# Patient Record
Sex: FEMALE | Race: BLACK OR AFRICAN AMERICAN | Employment: OTHER | URBAN - METROPOLITAN AREA
[De-identification: names, ages, dates, MRNs, and addresses within clinical notes are randomized per-mention and may not be internally consistent; named-entity substitution may affect disease eponyms.]

---

## 2017-01-01 ENCOUNTER — CARE COORDINATION (OUTPATIENT)
Dept: CARE COORDINATION | Age: 72
End: 2017-01-01

## 2018-01-01 ENCOUNTER — APPOINTMENT (OUTPATIENT)
Dept: GENERAL RADIOLOGY | Age: 73
DRG: 025 | End: 2018-01-01
Payer: MEDICARE

## 2018-01-01 ENCOUNTER — HOSPITAL ENCOUNTER (INPATIENT)
Dept: ICU | Age: 73
LOS: 15 days | Discharge: HOSPICE | DRG: 025 | End: 2018-03-21
Attending: EMERGENCY MEDICINE | Admitting: INTERNAL MEDICINE
Payer: MEDICARE

## 2018-01-01 ENCOUNTER — APPOINTMENT (OUTPATIENT)
Dept: CT IMAGING | Age: 73
DRG: 025 | End: 2018-01-01
Payer: MEDICARE

## 2018-01-01 VITALS
OXYGEN SATURATION: 100 % | HEART RATE: 104 BPM | HEIGHT: 63 IN | DIASTOLIC BLOOD PRESSURE: 60 MMHG | SYSTOLIC BLOOD PRESSURE: 96 MMHG | TEMPERATURE: 97.8 F | WEIGHT: 125.8 LBS | BODY MASS INDEX: 22.29 KG/M2 | RESPIRATION RATE: 20 BRPM

## 2018-01-01 DIAGNOSIS — R41.82 ALTERED MENTAL STATUS, UNSPECIFIED ALTERED MENTAL STATUS TYPE: ICD-10-CM

## 2018-01-01 DIAGNOSIS — S06.5XAA SUBDURAL HEMATOMA: Primary | ICD-10-CM

## 2018-01-01 DIAGNOSIS — Z79.01 CHRONIC ANTICOAGULATION: ICD-10-CM

## 2018-01-01 LAB
AADO2: 158.6 MMHG
AADO2: 214.1 MMHG
AADO2: 59.5 MMHG
AADO2: 83.9 MMHG
AADO2: 86.9 MMHG
AADO2: 87 MMHG
AADO2: 88.1 MMHG
AADO2: 91.8 MMHG
AADO2: 94.6 MMHG
ABO/RH: NORMAL
ALBUMIN SERPL-MCNC: 3.1 G/DL (ref 3.5–5.2)
ALBUMIN SERPL-MCNC: 3.5 G/DL (ref 3.5–5.2)
ALP BLD-CCNC: 71 U/L (ref 35–104)
ALP BLD-CCNC: 84 U/L (ref 35–104)
ALT SERPL-CCNC: 14 U/L (ref 0–32)
ALT SERPL-CCNC: 17 U/L (ref 0–32)
ANION GAP SERPL CALCULATED.3IONS-SCNC: 11 MMOL/L (ref 7–16)
ANION GAP SERPL CALCULATED.3IONS-SCNC: 12 MMOL/L (ref 7–16)
ANION GAP SERPL CALCULATED.3IONS-SCNC: 13 MMOL/L (ref 7–16)
ANION GAP SERPL CALCULATED.3IONS-SCNC: 14 MMOL/L (ref 7–16)
ANION GAP SERPL CALCULATED.3IONS-SCNC: 15 MMOL/L (ref 7–16)
ANION GAP SERPL CALCULATED.3IONS-SCNC: 15 MMOL/L (ref 7–16)
ANION GAP SERPL CALCULATED.3IONS-SCNC: 16 MMOL/L (ref 7–16)
ANION GAP SERPL CALCULATED.3IONS-SCNC: 17 MMOL/L (ref 7–16)
ANION GAP SERPL CALCULATED.3IONS-SCNC: 9 MMOL/L (ref 7–16)
ANTIBODY SCREEN: NORMAL
AST SERPL-CCNC: 19 U/L (ref 0–31)
AST SERPL-CCNC: 19 U/L (ref 0–31)
B.E.: 3.1 MMOL/L (ref -3–3)
B.E.: 3.8 MMOL/L (ref -3–3)
B.E.: 3.8 MMOL/L (ref -3–3)
B.E.: 4.5 MMOL/L (ref -3–3)
B.E.: 4.6 MMOL/L (ref -3–3)
B.E.: 4.9 MMOL/L (ref -3–3)
B.E.: 5.4 MMOL/L (ref -3–3)
B.E.: 5.5 MMOL/L (ref -3–3)
B.E.: 5.8 MMOL/L (ref -3–3)
BACTERIA: ABNORMAL /HPF
BACTERIA: ABNORMAL /HPF
BASOPHILS ABSOLUTE: 0.01 E9/L (ref 0–0.2)
BASOPHILS ABSOLUTE: 0.02 E9/L (ref 0–0.2)
BASOPHILS RELATIVE PERCENT: 0.1 % (ref 0–2)
BASOPHILS RELATIVE PERCENT: 0.2 % (ref 0–2)
BILIRUB SERPL-MCNC: 0.3 MG/DL (ref 0–1.2)
BILIRUB SERPL-MCNC: 0.3 MG/DL (ref 0–1.2)
BILIRUBIN URINE: NEGATIVE
BILIRUBIN URINE: NEGATIVE
BLOOD BANK DISPENSE STATUS: NORMAL
BLOOD BANK DISPENSE STATUS: NORMAL
BLOOD BANK PRODUCT CODE: NORMAL
BLOOD BANK PRODUCT CODE: NORMAL
BLOOD CULTURE, ROUTINE: NORMAL
BLOOD, URINE: ABNORMAL
BLOOD, URINE: ABNORMAL
BPU ID: NORMAL
BPU ID: NORMAL
BUN BLDV-MCNC: 20 MG/DL (ref 8–23)
BUN BLDV-MCNC: 21 MG/DL (ref 8–23)
BUN BLDV-MCNC: 21 MG/DL (ref 8–23)
BUN BLDV-MCNC: 22 MG/DL (ref 8–23)
BUN BLDV-MCNC: 31 MG/DL (ref 8–23)
BUN BLDV-MCNC: 31 MG/DL (ref 8–23)
BUN BLDV-MCNC: 35 MG/DL (ref 8–23)
BUN BLDV-MCNC: 36 MG/DL (ref 8–23)
BUN BLDV-MCNC: 37 MG/DL (ref 8–23)
BUN BLDV-MCNC: 37 MG/DL (ref 8–23)
BUN BLDV-MCNC: 41 MG/DL (ref 8–23)
BUN BLDV-MCNC: 45 MG/DL (ref 8–23)
BUN BLDV-MCNC: 47 MG/DL (ref 8–23)
CALCIUM SERPL-MCNC: 8.3 MG/DL (ref 8.6–10.2)
CALCIUM SERPL-MCNC: 8.4 MG/DL (ref 8.6–10.2)
CALCIUM SERPL-MCNC: 8.5 MG/DL (ref 8.6–10.2)
CALCIUM SERPL-MCNC: 8.6 MG/DL (ref 8.6–10.2)
CALCIUM SERPL-MCNC: 8.7 MG/DL (ref 8.6–10.2)
CALCIUM SERPL-MCNC: 8.7 MG/DL (ref 8.6–10.2)
CALCIUM SERPL-MCNC: 8.9 MG/DL (ref 8.6–10.2)
CALCIUM SERPL-MCNC: 8.9 MG/DL (ref 8.6–10.2)
CALCIUM SERPL-MCNC: 9.5 MG/DL (ref 8.6–10.2)
CHLORIDE BLD-SCNC: 100 MMOL/L (ref 98–107)
CHLORIDE BLD-SCNC: 101 MMOL/L (ref 98–107)
CHLORIDE BLD-SCNC: 103 MMOL/L (ref 98–107)
CHLORIDE BLD-SCNC: 105 MMOL/L (ref 98–107)
CHLORIDE BLD-SCNC: 105 MMOL/L (ref 98–107)
CHLORIDE BLD-SCNC: 99 MMOL/L (ref 98–107)
CLARITY: ABNORMAL
CLARITY: CLEAR
CO2: 24 MMOL/L (ref 22–29)
CO2: 25 MMOL/L (ref 22–29)
CO2: 26 MMOL/L (ref 22–29)
CO2: 27 MMOL/L (ref 22–29)
CO2: 28 MMOL/L (ref 22–29)
COHB: 0.1 % (ref 0–1.5)
COHB: 0.2 % (ref 0–1.5)
COHB: 0.3 % (ref 0–1.5)
COHB: 0.9 % (ref 0–1.5)
COLOR: YELLOW
COLOR: YELLOW
COMMENT: ABNORMAL
COMMENT: ABNORMAL
CREAT SERPL-MCNC: 0.9 MG/DL (ref 0.5–1)
CREAT SERPL-MCNC: 1 MG/DL (ref 0.5–1)
CREAT SERPL-MCNC: 1 MG/DL (ref 0.5–1)
CRITICAL: ABNORMAL
CRYSTALS, UA: ABNORMAL
CULTURE, BLOOD 2: NORMAL
DATE ANALYZED: ABNORMAL
DATE OF COLLECTION: ABNORMAL
DESCRIPTION BLOOD BANK: NORMAL
DESCRIPTION BLOOD BANK: NORMAL
EKG ATRIAL RATE: 73 BPM
EKG P AXIS: 37 DEGREES
EKG P-R INTERVAL: 158 MS
EKG Q-T INTERVAL: 432 MS
EKG QRS DURATION: 90 MS
EKG QTC CALCULATION (BAZETT): 475 MS
EKG R AXIS: -45 DEGREES
EKG T AXIS: 22 DEGREES
EKG VENTRICULAR RATE: 73 BPM
EOSINOPHILS ABSOLUTE: 0 E9/L (ref 0.05–0.5)
EOSINOPHILS ABSOLUTE: 0.01 E9/L (ref 0.05–0.5)
EOSINOPHILS ABSOLUTE: 0.03 E9/L (ref 0.05–0.5)
EOSINOPHILS ABSOLUTE: 0.18 E9/L (ref 0.05–0.5)
EOSINOPHILS ABSOLUTE: 0.27 E9/L (ref 0.05–0.5)
EOSINOPHILS ABSOLUTE: 0.29 E9/L (ref 0.05–0.5)
EOSINOPHILS ABSOLUTE: 0.3 E9/L (ref 0.05–0.5)
EOSINOPHILS ABSOLUTE: 0.31 E9/L (ref 0.05–0.5)
EOSINOPHILS RELATIVE PERCENT: 0 % (ref 0–6)
EOSINOPHILS RELATIVE PERCENT: 0.1 % (ref 0–6)
EOSINOPHILS RELATIVE PERCENT: 0.2 % (ref 0–6)
EOSINOPHILS RELATIVE PERCENT: 1.1 % (ref 0–6)
EOSINOPHILS RELATIVE PERCENT: 1.7 % (ref 0–6)
EOSINOPHILS RELATIVE PERCENT: 2 % (ref 0–6)
EOSINOPHILS RELATIVE PERCENT: 2.1 % (ref 0–6)
EOSINOPHILS RELATIVE PERCENT: 2.2 % (ref 0–6)
EPITHELIAL CELLS, UA: ABNORMAL /HPF
EPITHELIAL CELLS, UA: ABNORMAL /HPF
FIO2: 100 %
FIO2: 40 %
GFR AFRICAN AMERICAN: >60
GFR NON-AFRICAN AMERICAN: >60 ML/MIN/1.73
GLUCOSE BLD-MCNC: 104 MG/DL (ref 74–109)
GLUCOSE BLD-MCNC: 120 MG/DL (ref 74–109)
GLUCOSE BLD-MCNC: 124 MG/DL (ref 74–109)
GLUCOSE BLD-MCNC: 124 MG/DL (ref 74–109)
GLUCOSE BLD-MCNC: 135 MG/DL (ref 74–109)
GLUCOSE BLD-MCNC: 138 MG/DL (ref 74–109)
GLUCOSE BLD-MCNC: 146 MG/DL (ref 74–109)
GLUCOSE BLD-MCNC: 149 MG/DL (ref 74–109)
GLUCOSE BLD-MCNC: 150 MG/DL (ref 74–109)
GLUCOSE BLD-MCNC: 166 MG/DL (ref 74–109)
GLUCOSE BLD-MCNC: 178 MG/DL (ref 74–109)
GLUCOSE BLD-MCNC: 198 MG/DL (ref 74–109)
GLUCOSE BLD-MCNC: 225 MG/DL (ref 74–109)
GLUCOSE URINE: 100 MG/DL
GLUCOSE URINE: NEGATIVE MG/DL
HBA1C MFR BLD: 5.2 % (ref 4.8–5.9)
HCO3: 25.9 MMOL/L (ref 22–26)
HCO3: 26.5 MMOL/L (ref 22–26)
HCO3: 27.2 MMOL/L (ref 22–26)
HCO3: 27.4 MMOL/L (ref 22–26)
HCO3: 27.7 MMOL/L (ref 22–26)
HCO3: 28 MMOL/L (ref 22–26)
HCO3: 28.3 MMOL/L (ref 22–26)
HCO3: 28.8 MMOL/L (ref 22–26)
HCO3: 28.9 MMOL/L (ref 22–26)
HCT VFR BLD CALC: 22.2 % (ref 34–48)
HCT VFR BLD CALC: 22.4 % (ref 34–48)
HCT VFR BLD CALC: 23.1 % (ref 34–48)
HCT VFR BLD CALC: 23.8 % (ref 34–48)
HCT VFR BLD CALC: 24 % (ref 34–48)
HCT VFR BLD CALC: 24.2 % (ref 34–48)
HCT VFR BLD CALC: 24.2 % (ref 34–48)
HCT VFR BLD CALC: 24.7 % (ref 34–48)
HCT VFR BLD CALC: 24.8 % (ref 34–48)
HCT VFR BLD CALC: 25.1 % (ref 34–48)
HCT VFR BLD CALC: 25.8 % (ref 34–48)
HCT VFR BLD CALC: 28.1 % (ref 34–48)
HCT VFR BLD CALC: 32.7 % (ref 34–48)
HEMOGLOBIN: 10.9 G/DL (ref 11.5–15.5)
HEMOGLOBIN: 6.8 G/DL (ref 11.5–15.5)
HEMOGLOBIN: 7.1 G/DL (ref 11.5–15.5)
HEMOGLOBIN: 7.3 G/DL (ref 11.5–15.5)
HEMOGLOBIN: 7.5 G/DL (ref 11.5–15.5)
HEMOGLOBIN: 7.6 G/DL (ref 11.5–15.5)
HEMOGLOBIN: 7.8 G/DL (ref 11.5–15.5)
HEMOGLOBIN: 7.9 G/DL (ref 11.5–15.5)
HEMOGLOBIN: 7.9 G/DL (ref 11.5–15.5)
HEMOGLOBIN: 8.3 G/DL (ref 11.5–15.5)
HEMOGLOBIN: 8.8 G/DL (ref 11.5–15.5)
HHB: 0 % (ref 0–5)
HHB: 0.5 % (ref 0–5)
HHB: 0.6 % (ref 0–5)
HHB: 0.6 % (ref 0–5)
HHB: 0.7 % (ref 0–5)
HHB: 0.7 % (ref 0–5)
HHB: 0.9 % (ref 0–5)
HHB: 0.9 % (ref 0–5)
HHB: 4.8 % (ref 0–5)
IMMATURE GRANULOCYTES #: 0.05 E9/L
IMMATURE GRANULOCYTES #: 0.08 E9/L
IMMATURE GRANULOCYTES #: 0.08 E9/L
IMMATURE GRANULOCYTES #: 0.09 E9/L
IMMATURE GRANULOCYTES #: 0.1 E9/L
IMMATURE GRANULOCYTES #: 0.12 E9/L
IMMATURE GRANULOCYTES #: 0.13 E9/L
IMMATURE GRANULOCYTES #: 0.14 E9/L
IMMATURE GRANULOCYTES #: 0.18 E9/L
IMMATURE GRANULOCYTES #: 0.23 E9/L
IMMATURE GRANULOCYTES #: 0.28 E9/L
IMMATURE GRANULOCYTES #: 0.33 E9/L
IMMATURE GRANULOCYTES %: 0.4 % (ref 0–5)
IMMATURE GRANULOCYTES %: 0.6 % (ref 0–5)
IMMATURE GRANULOCYTES %: 0.6 % (ref 0–5)
IMMATURE GRANULOCYTES %: 0.7 % (ref 0–5)
IMMATURE GRANULOCYTES %: 0.7 % (ref 0–5)
IMMATURE GRANULOCYTES %: 0.9 % (ref 0–5)
IMMATURE GRANULOCYTES %: 1.3 % (ref 0–5)
IMMATURE GRANULOCYTES %: 1.3 % (ref 0–5)
IMMATURE GRANULOCYTES %: 2 % (ref 0–5)
IMMATURE GRANULOCYTES %: 2.1 % (ref 0–5)
INR BLD: 1.3
INR BLD: 1.5
KETONES, URINE: NEGATIVE MG/DL
KETONES, URINE: NEGATIVE MG/DL
LAB: 9558
LACTIC ACID: 1.7 MMOL/L (ref 0.5–2.2)
LEUKOCYTE ESTERASE, URINE: ABNORMAL
LEUKOCYTE ESTERASE, URINE: NEGATIVE
LYMPHOCYTES ABSOLUTE: 0.63 E9/L (ref 1.5–4)
LYMPHOCYTES ABSOLUTE: 0.64 E9/L (ref 1.5–4)
LYMPHOCYTES ABSOLUTE: 0.69 E9/L (ref 1.5–4)
LYMPHOCYTES ABSOLUTE: 0.81 E9/L (ref 1.5–4)
LYMPHOCYTES ABSOLUTE: 0.9 E9/L (ref 1.5–4)
LYMPHOCYTES ABSOLUTE: 0.99 E9/L (ref 1.5–4)
LYMPHOCYTES ABSOLUTE: 1.2 E9/L (ref 1.5–4)
LYMPHOCYTES ABSOLUTE: 1.26 E9/L (ref 1.5–4)
LYMPHOCYTES ABSOLUTE: 1.34 E9/L (ref 1.5–4)
LYMPHOCYTES ABSOLUTE: 1.38 E9/L (ref 1.5–4)
LYMPHOCYTES ABSOLUTE: 1.46 E9/L (ref 1.5–4)
LYMPHOCYTES ABSOLUTE: 1.56 E9/L (ref 1.5–4)
LYMPHOCYTES RELATIVE PERCENT: 11.9 % (ref 20–42)
LYMPHOCYTES RELATIVE PERCENT: 4.4 % (ref 20–42)
LYMPHOCYTES RELATIVE PERCENT: 4.9 % (ref 20–42)
LYMPHOCYTES RELATIVE PERCENT: 5.7 % (ref 20–42)
LYMPHOCYTES RELATIVE PERCENT: 5.7 % (ref 20–42)
LYMPHOCYTES RELATIVE PERCENT: 6.5 % (ref 20–42)
LYMPHOCYTES RELATIVE PERCENT: 7.5 % (ref 20–42)
LYMPHOCYTES RELATIVE PERCENT: 7.8 % (ref 20–42)
LYMPHOCYTES RELATIVE PERCENT: 8.5 % (ref 20–42)
LYMPHOCYTES RELATIVE PERCENT: 9.3 % (ref 20–42)
LYMPHOCYTES RELATIVE PERCENT: 9.8 % (ref 20–42)
LYMPHOCYTES RELATIVE PERCENT: 9.9 % (ref 20–42)
Lab: 410
Lab: 420
Lab: 430
Lab: 445
Lab: 451
Lab: 511
Lab: 511
Lab: 515
Lab: 700
MAGNESIUM: 1.7 MG/DL (ref 1.6–2.6)
MAGNESIUM: 2.2 MG/DL (ref 1.6–2.6)
MAGNESIUM: 2.2 MG/DL (ref 1.6–2.6)
MAGNESIUM: 2.3 MG/DL (ref 1.6–2.6)
MAGNESIUM: 2.4 MG/DL (ref 1.6–2.6)
MAGNESIUM: 2.5 MG/DL (ref 1.6–2.6)
MAGNESIUM: 2.6 MG/DL (ref 1.6–2.6)
MAGNESIUM: 2.6 MG/DL (ref 1.6–2.6)
MCH RBC QN AUTO: 27.4 PG (ref 26–35)
MCH RBC QN AUTO: 27.7 PG (ref 26–35)
MCH RBC QN AUTO: 27.8 PG (ref 26–35)
MCH RBC QN AUTO: 27.9 PG (ref 26–35)
MCH RBC QN AUTO: 28 PG (ref 26–35)
MCH RBC QN AUTO: 28.1 PG (ref 26–35)
MCH RBC QN AUTO: 28.1 PG (ref 26–35)
MCH RBC QN AUTO: 28.2 PG (ref 26–35)
MCH RBC QN AUTO: 28.3 PG (ref 26–35)
MCH RBC QN AUTO: 28.4 PG (ref 26–35)
MCH RBC QN AUTO: 28.4 PG (ref 26–35)
MCH RBC QN AUTO: 28.8 PG (ref 26–35)
MCH RBC QN AUTO: 29.3 PG (ref 26–35)
MCHC RBC AUTO-ENTMCNC: 30.6 % (ref 32–34.5)
MCHC RBC AUTO-ENTMCNC: 30.6 % (ref 32–34.5)
MCHC RBC AUTO-ENTMCNC: 31.3 % (ref 32–34.5)
MCHC RBC AUTO-ENTMCNC: 31.4 % (ref 32–34.5)
MCHC RBC AUTO-ENTMCNC: 31.5 % (ref 32–34.5)
MCHC RBC AUTO-ENTMCNC: 31.6 % (ref 32–34.5)
MCHC RBC AUTO-ENTMCNC: 31.6 % (ref 32–34.5)
MCHC RBC AUTO-ENTMCNC: 31.7 % (ref 32–34.5)
MCHC RBC AUTO-ENTMCNC: 31.9 % (ref 32–34.5)
MCHC RBC AUTO-ENTMCNC: 32.2 % (ref 32–34.5)
MCHC RBC AUTO-ENTMCNC: 32.5 % (ref 32–34.5)
MCHC RBC AUTO-ENTMCNC: 33.1 % (ref 32–34.5)
MCHC RBC AUTO-ENTMCNC: 33.3 % (ref 32–34.5)
MCV RBC AUTO: 87.2 FL (ref 80–99.9)
MCV RBC AUTO: 87.3 FL (ref 80–99.9)
MCV RBC AUTO: 87.9 FL (ref 80–99.9)
MCV RBC AUTO: 88 FL (ref 80–99.9)
MCV RBC AUTO: 88.3 FL (ref 80–99.9)
MCV RBC AUTO: 88.3 FL (ref 80–99.9)
MCV RBC AUTO: 88.5 FL (ref 80–99.9)
MCV RBC AUTO: 89.1 FL (ref 80–99.9)
MCV RBC AUTO: 89.2 FL (ref 80–99.9)
MCV RBC AUTO: 89.5 FL (ref 80–99.9)
MCV RBC AUTO: 90.8 FL (ref 80–99.9)
METER GLUCOSE: 101 MG/DL (ref 70–110)
METER GLUCOSE: 106 MG/DL (ref 70–110)
METER GLUCOSE: 108 MG/DL (ref 70–110)
METER GLUCOSE: 110 MG/DL (ref 70–110)
METER GLUCOSE: 111 MG/DL (ref 70–110)
METER GLUCOSE: 114 MG/DL (ref 70–110)
METER GLUCOSE: 116 MG/DL (ref 70–110)
METER GLUCOSE: 118 MG/DL (ref 70–110)
METER GLUCOSE: 118 MG/DL (ref 70–110)
METER GLUCOSE: 120 MG/DL (ref 70–110)
METER GLUCOSE: 121 MG/DL (ref 70–110)
METER GLUCOSE: 122 MG/DL (ref 70–110)
METER GLUCOSE: 124 MG/DL (ref 70–110)
METER GLUCOSE: 125 MG/DL (ref 70–110)
METER GLUCOSE: 126 MG/DL (ref 70–110)
METER GLUCOSE: 132 MG/DL (ref 70–110)
METER GLUCOSE: 132 MG/DL (ref 70–110)
METER GLUCOSE: 133 MG/DL (ref 70–110)
METER GLUCOSE: 134 MG/DL (ref 70–110)
METER GLUCOSE: 136 MG/DL (ref 70–110)
METER GLUCOSE: 137 MG/DL (ref 70–110)
METER GLUCOSE: 138 MG/DL (ref 70–110)
METER GLUCOSE: 138 MG/DL (ref 70–110)
METER GLUCOSE: 139 MG/DL (ref 70–110)
METER GLUCOSE: 140 MG/DL (ref 70–110)
METER GLUCOSE: 140 MG/DL (ref 70–110)
METER GLUCOSE: 142 MG/DL (ref 70–110)
METER GLUCOSE: 142 MG/DL (ref 70–110)
METER GLUCOSE: 145 MG/DL (ref 70–110)
METER GLUCOSE: 146 MG/DL (ref 70–110)
METER GLUCOSE: 148 MG/DL (ref 70–110)
METER GLUCOSE: 148 MG/DL (ref 70–110)
METER GLUCOSE: 150 MG/DL (ref 70–110)
METER GLUCOSE: 151 MG/DL (ref 70–110)
METER GLUCOSE: 153 MG/DL (ref 70–110)
METER GLUCOSE: 153 MG/DL (ref 70–110)
METER GLUCOSE: 154 MG/DL (ref 70–110)
METER GLUCOSE: 155 MG/DL (ref 70–110)
METER GLUCOSE: 156 MG/DL (ref 70–110)
METER GLUCOSE: 157 MG/DL (ref 70–110)
METER GLUCOSE: 157 MG/DL (ref 70–110)
METER GLUCOSE: 158 MG/DL (ref 70–110)
METER GLUCOSE: 159 MG/DL (ref 70–110)
METER GLUCOSE: 161 MG/DL (ref 70–110)
METER GLUCOSE: 161 MG/DL (ref 70–110)
METER GLUCOSE: 162 MG/DL (ref 70–110)
METER GLUCOSE: 162 MG/DL (ref 70–110)
METER GLUCOSE: 163 MG/DL (ref 70–110)
METER GLUCOSE: 166 MG/DL (ref 70–110)
METER GLUCOSE: 166 MG/DL (ref 70–110)
METER GLUCOSE: 173 MG/DL (ref 70–110)
METER GLUCOSE: 175 MG/DL (ref 70–110)
METER GLUCOSE: 177 MG/DL (ref 70–110)
METER GLUCOSE: 179 MG/DL (ref 70–110)
METER GLUCOSE: 179 MG/DL (ref 70–110)
METER GLUCOSE: 183 MG/DL (ref 70–110)
METER GLUCOSE: 183 MG/DL (ref 70–110)
METER GLUCOSE: 189 MG/DL (ref 70–110)
METER GLUCOSE: 190 MG/DL (ref 70–110)
METER GLUCOSE: 191 MG/DL (ref 70–110)
METER GLUCOSE: 201 MG/DL (ref 70–110)
METER GLUCOSE: 202 MG/DL (ref 70–110)
METER GLUCOSE: 205 MG/DL (ref 70–110)
METER GLUCOSE: 205 MG/DL (ref 70–110)
METER GLUCOSE: 207 MG/DL (ref 70–110)
METER GLUCOSE: 207 MG/DL (ref 70–110)
METER GLUCOSE: 72 MG/DL (ref 70–110)
METER GLUCOSE: 72 MG/DL (ref 70–110)
METER GLUCOSE: 92 MG/DL (ref 70–110)
METER GLUCOSE: 96 MG/DL (ref 70–110)
METHB: 0.3 % (ref 0–1.5)
METHB: 0.4 % (ref 0–1.5)
METHB: 0.5 % (ref 0–1.5)
MODE: ABNORMAL
MODE: AC
MODE: AC
MONOCYTES ABSOLUTE: 0.21 E9/L (ref 0.1–0.95)
MONOCYTES ABSOLUTE: 0.27 E9/L (ref 0.1–0.95)
MONOCYTES ABSOLUTE: 0.55 E9/L (ref 0.1–0.95)
MONOCYTES ABSOLUTE: 0.62 E9/L (ref 0.1–0.95)
MONOCYTES ABSOLUTE: 0.71 E9/L (ref 0.1–0.95)
MONOCYTES ABSOLUTE: 0.73 E9/L (ref 0.1–0.95)
MONOCYTES ABSOLUTE: 0.78 E9/L (ref 0.1–0.95)
MONOCYTES ABSOLUTE: 0.78 E9/L (ref 0.1–0.95)
MONOCYTES ABSOLUTE: 0.84 E9/L (ref 0.1–0.95)
MONOCYTES ABSOLUTE: 0.86 E9/L (ref 0.1–0.95)
MONOCYTES ABSOLUTE: 0.87 E9/L (ref 0.1–0.95)
MONOCYTES ABSOLUTE: 0.93 E9/L (ref 0.1–0.95)
MONOCYTES RELATIVE PERCENT: 1.6 % (ref 2–12)
MONOCYTES RELATIVE PERCENT: 2.4 % (ref 2–12)
MONOCYTES RELATIVE PERCENT: 3.5 % (ref 2–12)
MONOCYTES RELATIVE PERCENT: 4.5 % (ref 2–12)
MONOCYTES RELATIVE PERCENT: 5.1 % (ref 2–12)
MONOCYTES RELATIVE PERCENT: 5.4 % (ref 2–12)
MONOCYTES RELATIVE PERCENT: 5.5 % (ref 2–12)
MONOCYTES RELATIVE PERCENT: 5.7 % (ref 2–12)
MONOCYTES RELATIVE PERCENT: 6.6 % (ref 2–12)
MONOCYTES RELATIVE PERCENT: 6.6 % (ref 2–12)
MRSA CULTURE ONLY: NORMAL
NEUTROPHILS ABSOLUTE: 10.32 E9/L (ref 1.8–7.3)
NEUTROPHILS ABSOLUTE: 10.37 E9/L (ref 1.8–7.3)
NEUTROPHILS ABSOLUTE: 10.7 E9/L (ref 1.8–7.3)
NEUTROPHILS ABSOLUTE: 10.79 E9/L (ref 1.8–7.3)
NEUTROPHILS ABSOLUTE: 11.54 E9/L (ref 1.8–7.3)
NEUTROPHILS ABSOLUTE: 11.79 E9/L (ref 1.8–7.3)
NEUTROPHILS ABSOLUTE: 11.84 E9/L (ref 1.8–7.3)
NEUTROPHILS ABSOLUTE: 11.91 E9/L (ref 1.8–7.3)
NEUTROPHILS ABSOLUTE: 12.41 E9/L (ref 1.8–7.3)
NEUTROPHILS ABSOLUTE: 13.35 E9/L (ref 1.8–7.3)
NEUTROPHILS ABSOLUTE: 14.25 E9/L (ref 1.8–7.3)
NEUTROPHILS ABSOLUTE: 14.32 E9/L (ref 1.8–7.3)
NEUTROPHILS RELATIVE PERCENT: 81.2 % (ref 43–80)
NEUTROPHILS RELATIVE PERCENT: 81.4 % (ref 43–80)
NEUTROPHILS RELATIVE PERCENT: 81.8 % (ref 43–80)
NEUTROPHILS RELATIVE PERCENT: 81.9 % (ref 43–80)
NEUTROPHILS RELATIVE PERCENT: 83.3 % (ref 43–80)
NEUTROPHILS RELATIVE PERCENT: 83.8 % (ref 43–80)
NEUTROPHILS RELATIVE PERCENT: 84.7 % (ref 43–80)
NEUTROPHILS RELATIVE PERCENT: 85.8 % (ref 43–80)
NEUTROPHILS RELATIVE PERCENT: 88 % (ref 43–80)
NEUTROPHILS RELATIVE PERCENT: 91.1 % (ref 43–80)
NEUTROPHILS RELATIVE PERCENT: 91.3 % (ref 43–80)
NEUTROPHILS RELATIVE PERCENT: 92.8 % (ref 43–80)
NITRITE, URINE: NEGATIVE
NITRITE, URINE: NEGATIVE
O2 CONTENT: 11.8 ML/DL
O2 CONTENT: 11.8 ML/DL
O2 CONTENT: 11.9 ML/DL
O2 CONTENT: 12.3 ML/DL
O2 CONTENT: 12.6 ML/DL
O2 CONTENT: 12.7 ML/DL
O2 CONTENT: 12.9 ML/DL
O2 CONTENT: 12.9 ML/DL
O2 CONTENT: 13.8 ML/DL
O2 SATURATION: 100 % (ref 92–98.5)
O2 SATURATION: 95.2 % (ref 92–98.5)
O2 SATURATION: 99.1 % (ref 92–98.5)
O2 SATURATION: 99.1 % (ref 92–98.5)
O2 SATURATION: 99.3 % (ref 92–98.5)
O2 SATURATION: 99.3 % (ref 92–98.5)
O2 SATURATION: 99.4 % (ref 92–98.5)
O2 SATURATION: 99.4 % (ref 92–98.5)
O2 SATURATION: 99.5 % (ref 92–98.5)
O2HB: 94.7 % (ref 94–97)
O2HB: 98.2 % (ref 94–97)
O2HB: 98.4 % (ref 94–97)
O2HB: 98.5 % (ref 94–97)
O2HB: 98.5 % (ref 94–97)
O2HB: 98.6 % (ref 94–97)
O2HB: 98.7 % (ref 94–97)
O2HB: 98.8 % (ref 94–97)
O2HB: 99.3 % (ref 94–97)
OPERATOR ID: 1064
OPERATOR ID: 1088
OPERATOR ID: 2067
OPERATOR ID: 264
OPERATOR ID: 264
OPERATOR ID: ABNORMAL
PATIENT TEMP: 37 C
PCO2: 32.4 MMHG (ref 35–45)
PCO2: 32.4 MMHG (ref 35–45)
PCO2: 33.2 MMHG (ref 35–45)
PCO2: 34.4 MMHG (ref 35–45)
PCO2: 35.3 MMHG (ref 35–45)
PCO2: 35.6 MMHG (ref 35–45)
PCO2: 35.7 MMHG (ref 35–45)
PCO2: 35.8 MMHG (ref 35–45)
PCO2: 36.7 MMHG (ref 35–45)
PDW BLD-RTO: 14.5 FL (ref 11.5–15)
PDW BLD-RTO: 14.6 FL (ref 11.5–15)
PDW BLD-RTO: 14.7 FL (ref 11.5–15)
PDW BLD-RTO: 14.8 FL (ref 11.5–15)
PDW BLD-RTO: 14.9 FL (ref 11.5–15)
PDW BLD-RTO: 15 FL (ref 11.5–15)
PDW BLD-RTO: 15.1 FL (ref 11.5–15)
PEEP/CPAP: 5 CMH?O
PFO2: 1.86 MMHG/%
PFO2: 3.49 MMHG/%
PFO2: 3.6 MMHG/%
PFO2: 3.66 MMHG/%
PFO2: 3.68 MMHG/%
PFO2: 3.77 MMHG/%
PFO2: 3.83 MMHG/%
PFO2: 4.36 MMHG/%
PFO2: 4.41 MMHG/%
PH BLOOD GAS: 7.5 (ref 7.35–7.45)
PH BLOOD GAS: 7.51 (ref 7.35–7.45)
PH BLOOD GAS: 7.51 (ref 7.35–7.45)
PH BLOOD GAS: 7.52 (ref 7.35–7.45)
PH BLOOD GAS: 7.52 (ref 7.35–7.45)
PH BLOOD GAS: 7.53 (ref 7.35–7.45)
PH UA: 7 (ref 5–9)
PH UA: >=9 (ref 5–9)
PHOSPHORUS: 1.3 MG/DL (ref 2.5–4.5)
PHOSPHORUS: 2.5 MG/DL (ref 2.5–4.5)
PHOSPHORUS: 2.6 MG/DL (ref 2.5–4.5)
PHOSPHORUS: 2.7 MG/DL (ref 2.5–4.5)
PHOSPHORUS: 2.7 MG/DL (ref 2.5–4.5)
PHOSPHORUS: 2.8 MG/DL (ref 2.5–4.5)
PHOSPHORUS: 2.8 MG/DL (ref 2.5–4.5)
PHOSPHORUS: 2.9 MG/DL (ref 2.5–4.5)
PHOSPHORUS: 2.9 MG/DL (ref 2.5–4.5)
PHOSPHORUS: 3 MG/DL (ref 2.5–4.5)
PHOSPHORUS: 3.4 MG/DL (ref 2.5–4.5)
PLATELET # BLD: 158 E9/L (ref 130–450)
PLATELET # BLD: 166 E9/L (ref 130–450)
PLATELET # BLD: 175 E9/L (ref 130–450)
PLATELET # BLD: 199 E9/L (ref 130–450)
PLATELET # BLD: 200 E9/L (ref 130–450)
PLATELET # BLD: 233 E9/L (ref 130–450)
PLATELET # BLD: 234 E9/L (ref 130–450)
PLATELET # BLD: 254 E9/L (ref 130–450)
PLATELET # BLD: 282 E9/L (ref 130–450)
PLATELET # BLD: 305 E9/L (ref 130–450)
PLATELET # BLD: 308 E9/L (ref 130–450)
PLATELET # BLD: 313 E9/L (ref 130–450)
PLATELET # BLD: 342 E9/L (ref 130–450)
PMV BLD AUTO: 10 FL (ref 7–12)
PMV BLD AUTO: 10 FL (ref 7–12)
PMV BLD AUTO: 10.5 FL (ref 7–12)
PMV BLD AUTO: 10.6 FL (ref 7–12)
PMV BLD AUTO: 10.7 FL (ref 7–12)
PMV BLD AUTO: 11 FL (ref 7–12)
PMV BLD AUTO: 9.5 FL (ref 7–12)
PMV BLD AUTO: 9.7 FL (ref 7–12)
PMV BLD AUTO: 9.9 FL (ref 7–12)
PO2: 139.5 MMHG (ref 60–100)
PO2: 143.8 MMHG (ref 60–100)
PO2: 146.5 MMHG (ref 60–100)
PO2: 147.4 MMHG (ref 60–100)
PO2: 150.9 MMHG (ref 60–100)
PO2: 153.1 MMHG (ref 60–100)
PO2: 174.5 MMHG (ref 60–100)
PO2: 441.5 MMHG (ref 60–100)
PO2: 74.4 MMHG (ref 60–100)
POTASSIUM REFLEX MAGNESIUM: 3.2 MMOL/L (ref 3.5–5)
POTASSIUM SERPL-SCNC: 3.3 MMOL/L (ref 3.5–5)
POTASSIUM SERPL-SCNC: 4 MMOL/L (ref 3.5–5)
POTASSIUM SERPL-SCNC: 4.2 MMOL/L (ref 3.5–5)
POTASSIUM SERPL-SCNC: 4.3 MMOL/L (ref 3.5–5)
POTASSIUM SERPL-SCNC: 4.3 MMOL/L (ref 3.5–5)
POTASSIUM SERPL-SCNC: 4.4 MMOL/L (ref 3.5–5)
POTASSIUM SERPL-SCNC: 4.5 MMOL/L (ref 3.5–5)
POTASSIUM SERPL-SCNC: 4.6 MMOL/L (ref 3.5–5)
POTASSIUM SERPL-SCNC: 4.7 MMOL/L (ref 3.5–5)
POTASSIUM SERPL-SCNC: 4.8 MMOL/L (ref 3.5–5)
PRO-BNP: 423 PG/ML (ref 0–450)
PROCALCITONIN: 0.16 NG/ML (ref 0–0.08)
PROTEIN UA: 30 MG/DL
PROTEIN UA: ABNORMAL MG/DL
PROTHROMBIN TIME: 14.2 SEC (ref 9.3–12.4)
PROTHROMBIN TIME: 16.6 SEC (ref 9.3–12.4)
PS: 10 CMH20
RBC # BLD: 2.48 E12/L (ref 3.5–5.5)
RBC # BLD: 2.51 E12/L (ref 3.5–5.5)
RBC # BLD: 2.59 E12/L (ref 3.5–5.5)
RBC # BLD: 2.67 E12/L (ref 3.5–5.5)
RBC # BLD: 2.74 E12/L (ref 3.5–5.5)
RBC # BLD: 2.75 E12/L (ref 3.5–5.5)
RBC # BLD: 2.75 E12/L (ref 3.5–5.5)
RBC # BLD: 2.79 E12/L (ref 3.5–5.5)
RBC # BLD: 2.81 E12/L (ref 3.5–5.5)
RBC # BLD: 2.84 E12/L (ref 3.5–5.5)
RBC # BLD: 2.88 E12/L (ref 3.5–5.5)
RBC # BLD: 3.15 E12/L (ref 3.5–5.5)
RBC # BLD: 3.72 E12/L (ref 3.5–5.5)
RBC UA: ABNORMAL /HPF (ref 0–2)
RBC UA: ABNORMAL /HPF (ref 0–2)
RI(T): 0.34
RI(T): 0.48
RI(T): 0.55
RI(T): 0.58
RI(T): 0.59
RI(T): 0.6
RI(T): 0.64
RI(T): 0.68
RI(T): 2.13
RR MECHANICAL: 12 B/MIN
RR MECHANICAL: 12 B/MIN
SODIUM BLD-SCNC: 139 MMOL/L (ref 132–146)
SODIUM BLD-SCNC: 140 MMOL/L (ref 132–146)
SODIUM BLD-SCNC: 141 MMOL/L (ref 132–146)
SODIUM BLD-SCNC: 142 MMOL/L (ref 132–146)
SODIUM BLD-SCNC: 144 MMOL/L (ref 132–146)
SOURCE, BLOOD GAS: ABNORMAL
SPECIFIC GRAVITY UA: 1.01 (ref 1–1.03)
SPECIFIC GRAVITY UA: <=1.005 (ref 1–1.03)
THB: 8.3 G/DL (ref 11.5–16.5)
THB: 8.4 G/DL (ref 11.5–16.5)
THB: 8.6 G/DL (ref 11.5–16.5)
THB: 8.8 G/DL (ref 11.5–16.5)
THB: 8.8 G/DL (ref 11.5–16.5)
THB: 8.9 G/DL (ref 11.5–16.5)
THB: 9 G/DL (ref 11.5–16.5)
THB: 9.1 G/DL (ref 11.5–16.5)
THB: 9.1 G/DL (ref 11.5–16.5)
TIME ANALYZED: 421
TIME ANALYZED: 428
TIME ANALYZED: 445
TIME ANALYZED: 448
TIME ANALYZED: 452
TIME ANALYZED: 513
TIME ANALYZED: 519
TIME ANALYZED: 520
TIME ANALYZED: 704
TOTAL PROTEIN: 6.3 G/DL (ref 6.4–8.3)
TOTAL PROTEIN: 7.3 G/DL (ref 6.4–8.3)
TRIGL SERPL-MCNC: 92 MG/DL (ref 0–149)
TROPONIN: 0.02 NG/ML (ref 0–0.03)
URINE CULTURE, ROUTINE: NORMAL
URINE CULTURE, ROUTINE: NORMAL
UROBILINOGEN, URINE: 0.2 E.U./DL
UROBILINOGEN, URINE: 1 E.U./DL
VT MECHANICAL: 400 ML
VT MECHANICAL: 400 ML
WBC # BLD: 11.3 E9/L (ref 4.5–11.5)
WBC # BLD: 12.7 E9/L (ref 4.5–11.5)
WBC # BLD: 12.8 E9/L (ref 4.5–11.5)
WBC # BLD: 12.8 E9/L (ref 4.5–11.5)
WBC # BLD: 13.1 E9/L (ref 4.5–11.5)
WBC # BLD: 13.8 E9/L (ref 4.5–11.5)
WBC # BLD: 14.1 E9/L (ref 4.5–11.5)
WBC # BLD: 14.2 E9/L (ref 4.5–11.5)
WBC # BLD: 14.4 E9/L (ref 4.5–11.5)
WBC # BLD: 15.4 E9/L (ref 4.5–11.5)
WBC # BLD: 15.6 E9/L (ref 4.5–11.5)
WBC # BLD: 15.9 E9/L (ref 4.5–11.5)
WBC # BLD: 17.2 E9/L (ref 4.5–11.5)
WBC UA: ABNORMAL /HPF (ref 0–5)
WBC UA: ABNORMAL /HPF (ref 0–5)

## 2018-01-01 PROCEDURE — 6370000000 HC RX 637 (ALT 250 FOR IP): Performed by: NURSE PRACTITIONER

## 2018-01-01 PROCEDURE — 2000000000 HC ICU R&B

## 2018-01-01 PROCEDURE — 80048 BASIC METABOLIC PNL TOTAL CA: CPT

## 2018-01-01 PROCEDURE — 6360000002 HC RX W HCPCS: Performed by: NURSE PRACTITIONER

## 2018-01-01 PROCEDURE — 94003 VENT MGMT INPAT SUBQ DAY: CPT

## 2018-01-01 PROCEDURE — 83735 ASSAY OF MAGNESIUM: CPT

## 2018-01-01 PROCEDURE — 2580000003 HC RX 258: Performed by: NEUROLOGICAL SURGERY

## 2018-01-01 PROCEDURE — 82805 BLOOD GASES W/O2 SATURATION: CPT

## 2018-01-01 PROCEDURE — 84100 ASSAY OF PHOSPHORUS: CPT

## 2018-01-01 PROCEDURE — 6370000000 HC RX 637 (ALT 250 FOR IP): Performed by: INTERNAL MEDICINE

## 2018-01-01 PROCEDURE — 99285 EMERGENCY DEPT VISIT HI MDM: CPT

## 2018-01-01 PROCEDURE — 36415 COLL VENOUS BLD VENIPUNCTURE: CPT

## 2018-01-01 PROCEDURE — 83036 HEMOGLOBIN GLYCOSYLATED A1C: CPT

## 2018-01-01 PROCEDURE — 9990 CHARGE CONVERSION

## 2018-01-01 PROCEDURE — 86850 RBC ANTIBODY SCREEN: CPT

## 2018-01-01 PROCEDURE — APPSS60 APP SPLIT SHARED TIME 46-60 MINUTES: Performed by: NURSE PRACTITIONER

## 2018-01-01 PROCEDURE — 1200000000 HC SEMI PRIVATE

## 2018-01-01 PROCEDURE — 82962 GLUCOSE BLOOD TEST: CPT

## 2018-01-01 PROCEDURE — 85025 COMPLETE CBC W/AUTO DIFF WBC: CPT

## 2018-01-01 PROCEDURE — G8987 SELF CARE CURRENT STATUS: HCPCS

## 2018-01-01 PROCEDURE — 99291 CRITICAL CARE FIRST HOUR: CPT | Performed by: SURGERY

## 2018-01-01 PROCEDURE — 00C40ZZ EXTIRPATION OF MATTER FROM INTRACRANIAL SUBDURAL SPACE, OPEN APPROACH: ICD-10-PCS | Performed by: NEUROLOGICAL SURGERY

## 2018-01-01 PROCEDURE — 71045 X-RAY EXAM CHEST 1 VIEW: CPT

## 2018-01-01 PROCEDURE — 86923 COMPATIBILITY TEST ELECTRIC: CPT

## 2018-01-01 PROCEDURE — C9113 INJ PANTOPRAZOLE SODIUM, VIA: HCPCS | Performed by: NURSE PRACTITIONER

## 2018-01-01 PROCEDURE — 0BH17EZ INSERTION OF ENDOTRACHEAL AIRWAY INTO TRACHEA, VIA NATURAL OR ARTIFICIAL OPENING: ICD-10-PCS | Performed by: ANESTHESIOLOGY

## 2018-01-01 PROCEDURE — 70450 CT HEAD/BRAIN W/O DYE: CPT

## 2018-01-01 PROCEDURE — 94640 AIRWAY INHALATION TREATMENT: CPT

## 2018-01-01 PROCEDURE — APPSS30 APP SPLIT SHARED TIME 16-30 MINUTES: Performed by: NURSE PRACTITIONER

## 2018-01-01 PROCEDURE — G8988 SELF CARE GOAL STATUS: HCPCS

## 2018-01-01 PROCEDURE — 87081 CULTURE SCREEN ONLY: CPT

## 2018-01-01 PROCEDURE — 2580000003 HC RX 258: Performed by: NURSE PRACTITIONER

## 2018-01-01 PROCEDURE — 84484 ASSAY OF TROPONIN QUANT: CPT

## 2018-01-01 PROCEDURE — 94799 UNLISTED PULMONARY SVC/PX: CPT

## 2018-01-01 PROCEDURE — 88304 TISSUE EXAM BY PATHOLOGIST: CPT

## 2018-01-01 PROCEDURE — 31500 INSERT EMERGENCY AIRWAY: CPT

## 2018-01-01 PROCEDURE — 86901 BLOOD TYPING SEROLOGIC RH(D): CPT

## 2018-01-01 PROCEDURE — 97166 OT EVAL MOD COMPLEX 45 MIN: CPT

## 2018-01-01 PROCEDURE — 83605 ASSAY OF LACTIC ACID: CPT

## 2018-01-01 PROCEDURE — 84478 ASSAY OF TRIGLYCERIDES: CPT

## 2018-01-01 PROCEDURE — 93005 ELECTROCARDIOGRAM TRACING: CPT

## 2018-01-01 PROCEDURE — 85610 PROTHROMBIN TIME: CPT

## 2018-01-01 PROCEDURE — 87040 BLOOD CULTURE FOR BACTERIA: CPT

## 2018-01-01 PROCEDURE — 86900 BLOOD TYPING SEROLOGIC ABO: CPT

## 2018-01-01 PROCEDURE — APPSS15 APP SPLIT SHARED TIME 0-15 MINUTES: Performed by: NURSE PRACTITIONER

## 2018-01-01 PROCEDURE — APPSS45 APP SPLIT SHARED TIME 31-45 MINUTES: Performed by: NURSE PRACTITIONER

## 2018-01-01 PROCEDURE — 83880 ASSAY OF NATRIURETIC PEPTIDE: CPT

## 2018-01-01 PROCEDURE — 97140 MANUAL THERAPY 1/> REGIONS: CPT

## 2018-01-01 PROCEDURE — 80053 COMPREHEN METABOLIC PANEL: CPT

## 2018-01-01 PROCEDURE — 2500000003 HC RX 250 WO HCPCS: Performed by: NURSE PRACTITIONER

## 2018-01-01 PROCEDURE — 87088 URINE BACTERIA CULTURE: CPT

## 2018-01-01 PROCEDURE — 5A1955Z RESPIRATORY VENTILATION, GREATER THAN 96 CONSECUTIVE HOURS: ICD-10-PCS | Performed by: ANESTHESIOLOGY

## 2018-01-01 PROCEDURE — 95819 EEG AWAKE AND ASLEEP: CPT

## 2018-01-01 PROCEDURE — 94002 VENT MGMT INPAT INIT DAY: CPT

## 2018-01-01 PROCEDURE — 81001 URINALYSIS AUTO W/SCOPE: CPT

## 2018-01-01 PROCEDURE — 95822 EEG COMA OR SLEEP ONLY: CPT | Performed by: PSYCHIATRY & NEUROLOGY

## 2018-01-01 PROCEDURE — 99232 SBSQ HOSP IP/OBS MODERATE 35: CPT | Performed by: NURSE PRACTITIONER

## 2018-01-01 PROCEDURE — 94761 N-INVAS EAR/PLS OXIMETRY MLT: CPT

## 2018-01-01 PROCEDURE — 84145 PROCALCITONIN (PCT): CPT

## 2018-01-01 PROCEDURE — C9113 INJ PANTOPRAZOLE SODIUM, VIA: HCPCS

## 2018-01-01 PROCEDURE — 0W310ZZ CONTROL BLEEDING IN CRANIAL CAVITY, OPEN APPROACH: ICD-10-PCS | Performed by: NEUROLOGICAL SURGERY

## 2018-01-01 PROCEDURE — 6370000000 HC RX 637 (ALT 250 FOR IP): Performed by: NEUROLOGICAL SURGERY

## 2018-01-01 PROCEDURE — 36593 DECLOT VASCULAR DEVICE: CPT

## 2018-01-01 PROCEDURE — 85027 COMPLETE CBC AUTOMATED: CPT

## 2018-01-01 PROCEDURE — 6360000002 HC RX W HCPCS

## 2018-01-01 PROCEDURE — P9016 RBC LEUKOCYTES REDUCED: HCPCS

## 2018-01-01 RX ORDER — OXYCODONE HYDROCHLORIDE AND ACETAMINOPHEN 5; 325 MG/1; MG/1
1 TABLET ORAL PRN
Status: ACTIVE | OUTPATIENT
Start: 2018-01-01 | End: 2018-01-01

## 2018-01-01 RX ORDER — PROPOFOL 10 MG/ML
10 INJECTION, EMULSION INTRAVENOUS
Status: DISCONTINUED | OUTPATIENT
Start: 2018-01-01 | End: 2018-01-01

## 2018-01-01 RX ORDER — LORAZEPAM 2 MG/ML
1 INJECTION INTRAMUSCULAR
Status: DISCONTINUED | OUTPATIENT
Start: 2018-01-01 | End: 2018-01-01

## 2018-01-01 RX ORDER — LACTULOSE 10 G/15ML
20 SOLUTION ORAL 3 TIMES DAILY
Status: DISCONTINUED | OUTPATIENT
Start: 2018-01-01 | End: 2018-01-01

## 2018-01-01 RX ORDER — ACETAMINOPHEN 325 MG/1
650 TABLET ORAL EVERY 4 HOURS PRN
Status: DISCONTINUED | OUTPATIENT
Start: 2018-01-01 | End: 2018-01-01

## 2018-01-01 RX ORDER — DOCUSATE SODIUM 50 MG/5ML
100 LIQUID ORAL DAILY
Status: DISCONTINUED | OUTPATIENT
Start: 2018-01-01 | End: 2018-01-01

## 2018-01-01 RX ORDER — SODIUM CHLORIDE 9 MG/ML
INJECTION, SOLUTION INTRAVENOUS CONTINUOUS
Status: DISCONTINUED | OUTPATIENT
Start: 2018-01-01 | End: 2018-01-01

## 2018-01-01 RX ORDER — FENTANYL CITRATE 50 UG/ML
25 INJECTION, SOLUTION INTRAMUSCULAR; INTRAVENOUS
Status: DISCONTINUED | OUTPATIENT
Start: 2018-01-01 | End: 2018-01-01

## 2018-01-01 RX ORDER — HEPARIN SODIUM 10000 [USP'U]/ML
INJECTION, SOLUTION INTRAVENOUS; SUBCUTANEOUS
Status: DISCONTINUED
Start: 2018-01-01 | End: 2018-01-01

## 2018-01-01 RX ORDER — 0.9 % SODIUM CHLORIDE 0.9 %
10 VIAL (ML) INJECTION DAILY
Status: DISCONTINUED | OUTPATIENT
Start: 2018-01-01 | End: 2018-01-01

## 2018-01-01 RX ORDER — PANTOPRAZOLE SODIUM 40 MG/10ML
40 INJECTION, POWDER, LYOPHILIZED, FOR SOLUTION INTRAVENOUS DAILY
Status: DISCONTINUED | OUTPATIENT
Start: 2018-01-01 | End: 2018-01-01

## 2018-01-01 RX ORDER — FERROUS SULFATE 325(65) MG
325 TABLET ORAL 2 TIMES DAILY WITH MEALS
Status: DISCONTINUED | OUTPATIENT
Start: 2018-01-01 | End: 2018-01-01

## 2018-01-01 RX ORDER — LEVETIRACETAM 5 MG/ML
500 INJECTION INTRAVASCULAR 2 TIMES DAILY
Status: DISCONTINUED | OUTPATIENT
Start: 2018-01-01 | End: 2018-01-01

## 2018-01-01 RX ORDER — 0.9 % SODIUM CHLORIDE 0.9 %
500 INTRAVENOUS SOLUTION INTRAVENOUS ONCE
Status: COMPLETED | OUTPATIENT
Start: 2018-01-01 | End: 2018-01-01

## 2018-01-01 RX ORDER — NICOTINE POLACRILEX 4 MG
15 LOZENGE BUCCAL PRN
Status: DISCONTINUED | OUTPATIENT
Start: 2018-01-01 | End: 2018-01-01

## 2018-01-01 RX ORDER — FENTANYL CITRATE 50 UG/ML
50 INJECTION, SOLUTION INTRAMUSCULAR; INTRAVENOUS
Status: DISCONTINUED | OUTPATIENT
Start: 2018-01-01 | End: 2018-01-01

## 2018-01-01 RX ORDER — DEXTROSE MONOHYDRATE 50 MG/ML
100 INJECTION, SOLUTION INTRAVENOUS PRN
Status: DISCONTINUED | OUTPATIENT
Start: 2018-01-01 | End: 2018-01-01

## 2018-01-01 RX ORDER — LANOLIN ALCOHOL/MO/W.PET/CERES
400 CREAM (GRAM) TOPICAL DAILY
Status: DISCONTINUED | OUTPATIENT
Start: 2018-01-01 | End: 2018-01-01

## 2018-01-01 RX ORDER — MINERAL OIL AND WHITE PETROLATUM 150; 830 MG/G; MG/G
OINTMENT OPHTHALMIC
Status: DISCONTINUED | OUTPATIENT
Start: 2018-01-01 | End: 2018-01-01 | Stop reason: HOSPADM

## 2018-01-01 RX ORDER — GLYCOPYRROLATE 0.2 MG/ML
0.2 INJECTION INTRAMUSCULAR; INTRAVENOUS
Status: DISCONTINUED | OUTPATIENT
Start: 2018-01-01 | End: 2018-01-01 | Stop reason: HOSPADM

## 2018-01-01 RX ORDER — SODIUM CHLORIDE 450 MG/100ML
INJECTION, SOLUTION INTRAVENOUS CONTINUOUS
Status: DISCONTINUED | OUTPATIENT
Start: 2018-01-01 | End: 2018-01-01

## 2018-01-01 RX ORDER — LORAZEPAM 2 MG/ML
1 INJECTION INTRAMUSCULAR
Status: DISCONTINUED | OUTPATIENT
Start: 2018-01-01 | End: 2018-01-01 | Stop reason: HOSPADM

## 2018-01-01 RX ORDER — METOPROLOL TARTRATE 50 MG/1
50 TABLET, FILM COATED ORAL 2 TIMES DAILY
Status: DISCONTINUED | OUTPATIENT
Start: 2018-01-01 | End: 2018-01-01

## 2018-01-01 RX ORDER — TRAZODONE HYDROCHLORIDE 50 MG/1
50 TABLET ORAL NIGHTLY
Status: DISCONTINUED | OUTPATIENT
Start: 2018-01-01 | End: 2018-01-01

## 2018-01-01 RX ORDER — HYDROMORPHONE HCL 110MG/55ML
0.25 PATIENT CONTROLLED ANALGESIA SYRINGE INTRAVENOUS EVERY 5 MIN PRN
Status: DISCONTINUED | OUTPATIENT
Start: 2018-01-01 | End: 2018-01-01 | Stop reason: HOSPADM

## 2018-01-01 RX ORDER — HYDRALAZINE HYDROCHLORIDE 20 MG/ML
10 INJECTION INTRAMUSCULAR; INTRAVENOUS EVERY 10 MIN PRN
Status: DISCONTINUED | OUTPATIENT
Start: 2018-01-01 | End: 2018-01-01

## 2018-01-01 RX ORDER — DEXAMETHASONE SODIUM PHOSPHATE 4 MG/ML
INJECTION, SOLUTION INTRA-ARTICULAR; INTRALESIONAL; INTRAMUSCULAR; INTRAVENOUS; SOFT TISSUE
Status: DISPENSED
Start: 2018-01-01 | End: 2018-01-01

## 2018-01-01 RX ORDER — LEVETIRACETAM 5 MG/ML
1000 INJECTION INTRAVASCULAR ONCE
Status: COMPLETED | OUTPATIENT
Start: 2018-01-01 | End: 2018-01-01

## 2018-01-01 RX ORDER — DEXAMETHASONE SODIUM PHOSPHATE 10 MG/ML
10 INJECTION INTRAMUSCULAR; INTRAVENOUS EVERY 6 HOURS
Status: DISCONTINUED | OUTPATIENT
Start: 2018-01-01 | End: 2018-01-01

## 2018-01-01 RX ORDER — PROPOFOL 10 MG/ML
INJECTION, EMULSION INTRAVENOUS
Status: COMPLETED
Start: 2018-01-01 | End: 2018-01-01

## 2018-01-01 RX ORDER — CHLORHEXIDINE GLUCONATE 0.12 MG/ML
15 RINSE ORAL 2 TIMES DAILY
Status: DISCONTINUED | OUTPATIENT
Start: 2018-01-01 | End: 2018-01-01 | Stop reason: HOSPADM

## 2018-01-01 RX ORDER — PETROLATUM 42 G/100G
OINTMENT TOPICAL 2 TIMES DAILY PRN
Status: DISCONTINUED | OUTPATIENT
Start: 2018-01-01 | End: 2018-01-01 | Stop reason: HOSPADM

## 2018-01-01 RX ORDER — INSULIN GLARGINE 100 [IU]/ML
10 INJECTION, SOLUTION SUBCUTANEOUS NIGHTLY
Status: DISCONTINUED | OUTPATIENT
Start: 2018-01-01 | End: 2018-01-01

## 2018-01-01 RX ORDER — PANTOPRAZOLE SODIUM 40 MG/1
40 GRANULE, DELAYED RELEASE ORAL
Status: DISCONTINUED | OUTPATIENT
Start: 2018-01-01 | End: 2018-01-01

## 2018-01-01 RX ORDER — ONDANSETRON 2 MG/ML
4 INJECTION INTRAMUSCULAR; INTRAVENOUS EVERY 6 HOURS PRN
Status: DISCONTINUED | OUTPATIENT
Start: 2018-01-01 | End: 2018-01-01 | Stop reason: HOSPADM

## 2018-01-01 RX ORDER — DEXTROSE MONOHYDRATE 25 G/50ML
12.5 INJECTION, SOLUTION INTRAVENOUS PRN
Status: DISCONTINUED | OUTPATIENT
Start: 2018-01-01 | End: 2018-01-01

## 2018-01-01 RX ORDER — DONEPEZIL HYDROCHLORIDE 5 MG/1
10 TABLET, FILM COATED ORAL NIGHTLY
Status: DISCONTINUED | OUTPATIENT
Start: 2018-01-01 | End: 2018-01-01

## 2018-01-01 RX ORDER — VALSARTAN AND HYDROCHLOROTHIAZIDE 320; 25 MG/1; MG/1
1 TABLET, FILM COATED ORAL DAILY
Status: DISCONTINUED | OUTPATIENT
Start: 2018-01-01 | End: 2018-01-01 | Stop reason: SDUPTHER

## 2018-01-01 RX ORDER — ACETAMINOPHEN 325 MG/1
325 TABLET ORAL EVERY 4 HOURS PRN
Status: DISCONTINUED | OUTPATIENT
Start: 2018-01-01 | End: 2018-01-01 | Stop reason: SDUPTHER

## 2018-01-01 RX ORDER — POTASSIUM CHLORIDE 20MEQ/15ML
40 LIQUID (ML) ORAL 2 TIMES DAILY
Status: DISCONTINUED | OUTPATIENT
Start: 2018-01-01 | End: 2018-01-01

## 2018-01-01 RX ORDER — AMLODIPINE BESYLATE 2.5 MG/1
2.5 TABLET ORAL DAILY
Status: DISCONTINUED | OUTPATIENT
Start: 2018-01-01 | End: 2018-01-01

## 2018-01-01 RX ORDER — BISACODYL 10 MG
10 SUPPOSITORY, RECTAL RECTAL DAILY PRN
Status: DISCONTINUED | OUTPATIENT
Start: 2018-01-01 | End: 2018-01-01

## 2018-01-01 RX ORDER — HYDROCHLOROTHIAZIDE 25 MG/1
25 TABLET ORAL DAILY
Status: DISCONTINUED | OUTPATIENT
Start: 2018-01-01 | End: 2018-01-01

## 2018-01-01 RX ORDER — 0.9 % SODIUM CHLORIDE 0.9 %
250 INTRAVENOUS SOLUTION INTRAVENOUS ONCE
Status: DISCONTINUED | OUTPATIENT
Start: 2018-01-01 | End: 2018-01-01

## 2018-01-01 RX ORDER — SODIUM CHLORIDE 0.9 % (FLUSH) 0.9 %
10 SYRINGE (ML) INJECTION EVERY 12 HOURS SCHEDULED
Status: DISCONTINUED | OUTPATIENT
Start: 2018-01-01 | End: 2018-01-01

## 2018-01-01 RX ORDER — VALSARTAN 320 MG/1
320 TABLET ORAL DAILY
Status: DISCONTINUED | OUTPATIENT
Start: 2018-01-01 | End: 2018-01-01

## 2018-01-01 RX ORDER — MORPHINE SULFATE 2 MG/ML
2 INJECTION, SOLUTION INTRAMUSCULAR; INTRAVENOUS EVERY 5 MIN PRN
Status: DISCONTINUED | OUTPATIENT
Start: 2018-01-01 | End: 2018-01-01 | Stop reason: HOSPADM

## 2018-01-01 RX ORDER — LEVETIRACETAM 100 MG/ML
1500 SOLUTION ORAL 2 TIMES DAILY
Status: DISCONTINUED | OUTPATIENT
Start: 2018-01-01 | End: 2018-01-01

## 2018-01-01 RX ORDER — HEPARIN SODIUM (PORCINE) LOCK FLUSH IV SOLN 100 UNIT/ML 100 UNIT/ML
500 SOLUTION INTRAVENOUS PRN
Status: DISCONTINUED | OUTPATIENT
Start: 2018-01-01 | End: 2018-01-01

## 2018-01-01 RX ORDER — MEPERIDINE HYDROCHLORIDE 50 MG/ML
12.5 INJECTION INTRAMUSCULAR; INTRAVENOUS; SUBCUTANEOUS
Status: DISCONTINUED | OUTPATIENT
Start: 2018-01-01 | End: 2018-01-01 | Stop reason: HOSPADM

## 2018-01-01 RX ORDER — LEVETIRACETAM 15 MG/ML
1500 INJECTION INTRAVASCULAR 2 TIMES DAILY
Status: DISCONTINUED | OUTPATIENT
Start: 2018-01-01 | End: 2018-01-01

## 2018-01-01 RX ORDER — ONDANSETRON 2 MG/ML
4 INJECTION INTRAMUSCULAR; INTRAVENOUS
Status: ACTIVE | OUTPATIENT
Start: 2018-01-01 | End: 2018-01-01

## 2018-01-01 RX ORDER — ACETAMINOPHEN 160 MG/5ML
650 SOLUTION ORAL EVERY 4 HOURS PRN
Status: DISCONTINUED | OUTPATIENT
Start: 2018-01-01 | End: 2018-01-01

## 2018-01-01 RX ORDER — DEXAMETHASONE SODIUM PHOSPHATE 4 MG/ML
4 INJECTION, SOLUTION INTRA-ARTICULAR; INTRALESIONAL; INTRAMUSCULAR; INTRAVENOUS; SOFT TISSUE EVERY 6 HOURS
Status: DISCONTINUED | OUTPATIENT
Start: 2018-01-01 | End: 2018-01-01

## 2018-01-01 RX ORDER — OXYCODONE HYDROCHLORIDE AND ACETAMINOPHEN 5; 325 MG/1; MG/1
2 TABLET ORAL PRN
Status: ACTIVE | OUTPATIENT
Start: 2018-01-01 | End: 2018-01-01

## 2018-01-01 RX ORDER — LEVETIRACETAM 10 MG/ML
1000 INJECTION INTRAVASCULAR 2 TIMES DAILY
Status: DISCONTINUED | OUTPATIENT
Start: 2018-01-01 | End: 2018-01-01

## 2018-01-01 RX ORDER — MORPHINE SULFATE 2 MG/ML
1 INJECTION, SOLUTION INTRAMUSCULAR; INTRAVENOUS EVERY 5 MIN PRN
Status: DISCONTINUED | OUTPATIENT
Start: 2018-01-01 | End: 2018-01-01 | Stop reason: HOSPADM

## 2018-01-01 RX ORDER — HEPARIN SODIUM 10000 [USP'U]/ML
5000 INJECTION, SOLUTION INTRAVENOUS; SUBCUTANEOUS EVERY 8 HOURS SCHEDULED
Status: DISCONTINUED | OUTPATIENT
Start: 2018-01-01 | End: 2018-01-01

## 2018-01-01 RX ORDER — FERROUS SULFATE 300 MG/5ML
300 LIQUID (ML) ORAL 2 TIMES DAILY
Status: DISCONTINUED | OUTPATIENT
Start: 2018-01-01 | End: 2018-01-01

## 2018-01-01 RX ORDER — LORAZEPAM 2 MG/ML
1 INJECTION INTRAMUSCULAR EVERY 4 HOURS PRN
Status: DISCONTINUED | OUTPATIENT
Start: 2018-01-01 | End: 2018-01-01

## 2018-01-01 RX ORDER — SODIUM CHLORIDE 0.9 % (FLUSH) 0.9 %
10 SYRINGE (ML) INJECTION PRN
Status: DISCONTINUED | OUTPATIENT
Start: 2018-01-01 | End: 2018-01-01 | Stop reason: SDUPTHER

## 2018-01-01 RX ORDER — LEVETIRACETAM 5 MG/ML
1500 INJECTION INTRAVASCULAR 2 TIMES DAILY
Status: DISCONTINUED | OUTPATIENT
Start: 2018-01-01 | End: 2018-01-01

## 2018-01-01 RX ORDER — BISACODYL 10 MG
10 SUPPOSITORY, RECTAL RECTAL ONCE
Status: DISCONTINUED | OUTPATIENT
Start: 2018-01-01 | End: 2018-01-01

## 2018-01-01 RX ORDER — LABETALOL HYDROCHLORIDE 5 MG/ML
10 INJECTION, SOLUTION INTRAVENOUS EVERY 10 MIN PRN
Status: DISCONTINUED | OUTPATIENT
Start: 2018-01-01 | End: 2018-01-01

## 2018-01-01 RX ORDER — SODIUM CHLORIDE 0.9 % (FLUSH) 0.9 %
10 SYRINGE (ML) INJECTION PRN
Status: DISCONTINUED | OUTPATIENT
Start: 2018-01-01 | End: 2018-01-01

## 2018-01-01 RX ORDER — ACETAMINOPHEN 160 MG/5ML
650 SOLUTION ORAL EVERY 4 HOURS PRN
Status: DISCONTINUED | OUTPATIENT
Start: 2018-01-01 | End: 2018-01-01 | Stop reason: HOSPADM

## 2018-01-01 RX ORDER — DEXAMETHASONE SODIUM PHOSPHATE 10 MG/ML
10 INJECTION INTRAMUSCULAR; INTRAVENOUS EVERY 8 HOURS
Status: DISCONTINUED | OUTPATIENT
Start: 2018-01-01 | End: 2018-01-01

## 2018-01-01 RX ORDER — HYDROMORPHONE HCL 110MG/55ML
0.5 PATIENT CONTROLLED ANALGESIA SYRINGE INTRAVENOUS EVERY 5 MIN PRN
Status: DISCONTINUED | OUTPATIENT
Start: 2018-01-01 | End: 2018-01-01 | Stop reason: HOSPADM

## 2018-01-01 RX ORDER — OXYCODONE HYDROCHLORIDE 5 MG/1
5 TABLET ORAL EVERY 4 HOURS PRN
Status: DISCONTINUED | OUTPATIENT
Start: 2018-01-01 | End: 2018-01-01

## 2018-01-01 RX ORDER — LEVETIRACETAM 10 MG/ML
1000 INJECTION INTRAVASCULAR ONCE
Status: COMPLETED | OUTPATIENT
Start: 2018-01-01 | End: 2018-01-01

## 2018-01-01 RX ADMIN — METOPROLOL TARTRATE 25 MG: 25 TABLET ORAL at 21:14

## 2018-01-01 RX ADMIN — Medication 10 ML: at 08:18

## 2018-01-01 RX ADMIN — AMLODIPINE BESYLATE 2.5 MG: 2.5 TABLET ORAL at 09:21

## 2018-01-01 RX ADMIN — Medication 10 ML: at 21:24

## 2018-01-01 RX ADMIN — INSULIN LISPRO 3 UNITS: 100 INJECTION, SOLUTION INTRAVENOUS; SUBCUTANEOUS at 23:48

## 2018-01-01 RX ADMIN — DONEPEZIL HYDROCHLORIDE 10 MG: 5 TABLET, FILM COATED ORAL at 20:35

## 2018-01-01 RX ADMIN — HEPARIN SODIUM 5000 UNITS: 10000 INJECTION, SOLUTION INTRAVENOUS; SUBCUTANEOUS at 13:49

## 2018-01-01 RX ADMIN — PANTOPRAZOLE SODIUM 40 MG: 40 INJECTION, POWDER, FOR SOLUTION INTRAVENOUS at 12:09

## 2018-01-01 RX ADMIN — HYDROCHLOROTHIAZIDE 25 MG: 25 TABLET ORAL at 11:41

## 2018-01-01 RX ADMIN — LEVETIRACETAM 500 MG: 5 INJECTION INTRAVENOUS at 20:44

## 2018-01-01 RX ADMIN — METOPROLOL TARTRATE 25 MG: 25 TABLET ORAL at 09:06

## 2018-01-01 RX ADMIN — MINERAL SUPPLEMENT IRON 300 MG / 5 ML STRENGTH LIQUID 100 PER BOX UNFLAVORED 300 MG: at 20:44

## 2018-01-01 RX ADMIN — AMLODIPINE BESYLATE 2.5 MG: 2.5 TABLET ORAL at 09:24

## 2018-01-01 RX ADMIN — AMLODIPINE BESYLATE 2.5 MG: 2.5 TABLET ORAL at 08:13

## 2018-01-01 RX ADMIN — AMLODIPINE BESYLATE 2.5 MG: 2.5 TABLET ORAL at 09:41

## 2018-01-01 RX ADMIN — CHLORHEXIDINE GLUCONATE 15 ML: 1.2 RINSE ORAL at 20:11

## 2018-01-01 RX ADMIN — CHLORHEXIDINE GLUCONATE 15 ML: 1.2 RINSE ORAL at 21:00

## 2018-01-01 RX ADMIN — LORAZEPAM 1 MG: 2 INJECTION INTRAMUSCULAR; INTRAVENOUS at 20:41

## 2018-01-01 RX ADMIN — LORAZEPAM 1 MG: 2 INJECTION INTRAMUSCULAR; INTRAVENOUS at 13:45

## 2018-01-01 RX ADMIN — DOCUSATE SODIUM 100 MG: 50 LIQUID ORAL at 09:06

## 2018-01-01 RX ADMIN — LEVETIRACETAM 1500 MG: 100 SOLUTION ORAL at 10:01

## 2018-01-01 RX ADMIN — METOPROLOL TARTRATE 25 MG: 25 TABLET ORAL at 09:34

## 2018-01-01 RX ADMIN — MAGNESIUM GLUCONATE 500 MG ORAL TABLET 400 MG: 500 TABLET ORAL at 09:20

## 2018-01-01 RX ADMIN — HYDROCHLOROTHIAZIDE 25 MG: 25 TABLET ORAL at 10:40

## 2018-01-01 RX ADMIN — Medication 20 MG: at 20:57

## 2018-01-01 RX ADMIN — MAGNESIUM GLUCONATE 500 MG ORAL TABLET 400 MG: 500 TABLET ORAL at 09:57

## 2018-01-01 RX ADMIN — DOCUSATE SODIUM 100 MG: 50 LIQUID ORAL at 08:29

## 2018-01-01 RX ADMIN — Medication 10 ML: at 09:20

## 2018-01-01 RX ADMIN — LORAZEPAM 1 MG: 2 INJECTION INTRAMUSCULAR; INTRAVENOUS at 18:11

## 2018-01-01 RX ADMIN — VALSARTAN 320 MG: 320 TABLET ORAL at 08:45

## 2018-01-01 RX ADMIN — CHLORHEXIDINE GLUCONATE 15 ML: 1.2 RINSE ORAL at 20:47

## 2018-01-01 RX ADMIN — INSULIN LISPRO 3 UNITS: 100 INJECTION, SOLUTION INTRAVENOUS; SUBCUTANEOUS at 13:21

## 2018-01-01 RX ADMIN — CHLORHEXIDINE GLUCONATE 15 ML: 1.2 RINSE ORAL at 21:13

## 2018-01-01 RX ADMIN — METOPROLOL TARTRATE 25 MG: 25 TABLET ORAL at 21:33

## 2018-01-01 RX ADMIN — FENTANYL CITRATE 25 MCG: 50 INJECTION, SOLUTION INTRAMUSCULAR; INTRAVENOUS at 13:15

## 2018-01-01 RX ADMIN — Medication 1 MG: at 16:09

## 2018-01-01 RX ADMIN — LEVETIRACETAM 500 MG: 5 INJECTION INTRAVENOUS at 20:35

## 2018-01-01 RX ADMIN — Medication 1 MG: at 14:59

## 2018-01-01 RX ADMIN — PANTOPRAZOLE SODIUM 40 MG: 40 INJECTION, POWDER, FOR SOLUTION INTRAVENOUS at 09:26

## 2018-01-01 RX ADMIN — METOPROLOL TARTRATE 25 MG: 25 TABLET ORAL at 08:35

## 2018-01-01 RX ADMIN — INSULIN GLARGINE 10 UNITS: 100 INJECTION, SOLUTION SUBCUTANEOUS at 21:23

## 2018-01-01 RX ADMIN — LABETALOL HYDROCHLORIDE 10 MG: 5 INJECTION, SOLUTION INTRAVENOUS at 10:29

## 2018-01-01 RX ADMIN — MINERAL SUPPLEMENT IRON 300 MG / 5 ML STRENGTH LIQUID 100 PER BOX UNFLAVORED 300 MG: at 09:56

## 2018-01-01 RX ADMIN — CHLORHEXIDINE GLUCONATE 15 ML: 1.2 RINSE ORAL at 09:26

## 2018-01-01 RX ADMIN — Medication 325 MG: at 17:01

## 2018-01-01 RX ADMIN — LABETALOL HYDROCHLORIDE 10 MG: 5 INJECTION, SOLUTION INTRAVENOUS at 09:14

## 2018-01-01 RX ADMIN — INSULIN LISPRO 3 UNITS: 100 INJECTION, SOLUTION INTRAVENOUS; SUBCUTANEOUS at 04:30

## 2018-01-01 RX ADMIN — Medication 10 ML: at 20:44

## 2018-01-01 RX ADMIN — CHLORHEXIDINE GLUCONATE 15 ML: 1.2 RINSE ORAL at 09:20

## 2018-01-01 RX ADMIN — VALSARTAN 320 MG: 320 TABLET ORAL at 09:06

## 2018-01-01 RX ADMIN — Medication 10 ML: at 09:04

## 2018-01-01 RX ADMIN — HYDRALAZINE HYDROCHLORIDE 10 MG: 20 INJECTION INTRAMUSCULAR; INTRAVENOUS at 23:44

## 2018-01-01 RX ADMIN — Medication 10 ML: at 08:36

## 2018-01-01 RX ADMIN — SODIUM CHLORIDE: 9 INJECTION, SOLUTION INTRAVENOUS at 10:58

## 2018-01-01 RX ADMIN — GLYCOPYRROLATE 0.2 MG: 0.2 INJECTION INTRAMUSCULAR; INTRAVENOUS at 16:21

## 2018-01-01 RX ADMIN — Medication 325 MG: at 11:27

## 2018-01-01 RX ADMIN — CHLORHEXIDINE GLUCONATE 15 ML: 1.2 RINSE ORAL at 10:01

## 2018-01-01 RX ADMIN — HEPARIN SODIUM 5000 UNITS: 10000 INJECTION, SOLUTION INTRAVENOUS; SUBCUTANEOUS at 22:04

## 2018-01-01 RX ADMIN — INSULIN LISPRO 3 UNITS: 100 INJECTION, SOLUTION INTRAVENOUS; SUBCUTANEOUS at 08:21

## 2018-01-01 RX ADMIN — DONEPEZIL HYDROCHLORIDE 10 MG: 5 TABLET, FILM COATED ORAL at 20:47

## 2018-01-01 RX ADMIN — AMLODIPINE BESYLATE 2.5 MG: 2.5 TABLET ORAL at 10:28

## 2018-01-01 RX ADMIN — METOPROLOL TARTRATE 25 MG: 25 TABLET ORAL at 08:24

## 2018-01-01 RX ADMIN — LEVETIRACETAM 1500 MG: 5 INJECTION INTRAVENOUS at 21:15

## 2018-01-01 RX ADMIN — Medication 10 ML: at 08:51

## 2018-01-01 RX ADMIN — INSULIN LISPRO 3 UNITS: 100 INJECTION, SOLUTION INTRAVENOUS; SUBCUTANEOUS at 08:40

## 2018-01-01 RX ADMIN — INSULIN LISPRO 6 UNITS: 100 INJECTION, SOLUTION INTRAVENOUS; SUBCUTANEOUS at 04:10

## 2018-01-01 RX ADMIN — CHLORHEXIDINE GLUCONATE 15 ML: 1.2 RINSE ORAL at 20:48

## 2018-01-01 RX ADMIN — INSULIN LISPRO 3 UNITS: 100 INJECTION, SOLUTION INTRAVENOUS; SUBCUTANEOUS at 00:20

## 2018-01-01 RX ADMIN — HEPARIN SODIUM 5000 UNITS: 10000 INJECTION, SOLUTION INTRAVENOUS; SUBCUTANEOUS at 14:17

## 2018-01-01 RX ADMIN — HYDROCHLOROTHIAZIDE 25 MG: 25 TABLET ORAL at 08:30

## 2018-01-01 RX ADMIN — CHLORHEXIDINE GLUCONATE 15 ML: 1.2 RINSE ORAL at 09:41

## 2018-01-01 RX ADMIN — FENTANYL CITRATE 50 MCG: 50 INJECTION, SOLUTION INTRAMUSCULAR; INTRAVENOUS at 10:27

## 2018-01-01 RX ADMIN — LEVETIRACETAM 1000 MG: 10 INJECTION INTRAVENOUS at 09:50

## 2018-01-01 RX ADMIN — Medication 10 ML: at 09:06

## 2018-01-01 RX ADMIN — PANTOPRAZOLE SODIUM 40 MG: 40 GRANULE, DELAYED RELEASE ORAL at 06:40

## 2018-01-01 RX ADMIN — INSULIN LISPRO 3 UNITS: 100 INJECTION, SOLUTION INTRAVENOUS; SUBCUTANEOUS at 07:43

## 2018-01-01 RX ADMIN — LABETALOL HYDROCHLORIDE 10 MG: 5 INJECTION, SOLUTION INTRAVENOUS at 18:47

## 2018-01-01 RX ADMIN — Medication 10 ML: at 09:27

## 2018-01-01 RX ADMIN — MINERAL SUPPLEMENT IRON 300 MG / 5 ML STRENGTH LIQUID 100 PER BOX UNFLAVORED 300 MG: at 09:05

## 2018-01-01 RX ADMIN — DEXAMETHASONE SODIUM PHOSPHATE 10 MG: 10 INJECTION, SOLUTION INTRAMUSCULAR; INTRAVENOUS at 03:15

## 2018-01-01 RX ADMIN — DOCUSATE SODIUM 100 MG: 50 LIQUID ORAL at 09:23

## 2018-01-01 RX ADMIN — MINERAL SUPPLEMENT IRON 300 MG / 5 ML STRENGTH LIQUID 100 PER BOX UNFLAVORED 300 MG: at 08:12

## 2018-01-01 RX ADMIN — INSULIN LISPRO 3 UNITS: 100 INJECTION, SOLUTION INTRAVENOUS; SUBCUTANEOUS at 20:30

## 2018-01-01 RX ADMIN — INSULIN LISPRO 3 UNITS: 100 INJECTION, SOLUTION INTRAVENOUS; SUBCUTANEOUS at 20:55

## 2018-01-01 RX ADMIN — PROPOFOL 5 MCG/KG/MIN: 10 INJECTION, EMULSION INTRAVENOUS at 05:51

## 2018-01-01 RX ADMIN — SODIUM CHLORIDE: 9 INJECTION, SOLUTION INTRAVENOUS at 00:20

## 2018-01-01 RX ADMIN — METOPROLOL TARTRATE 50 MG: 50 TABLET, FILM COATED ORAL at 20:54

## 2018-01-01 RX ADMIN — VALSARTAN 320 MG: 320 TABLET ORAL at 09:20

## 2018-01-01 RX ADMIN — LORAZEPAM 1 MG: 2 INJECTION INTRAMUSCULAR; INTRAVENOUS at 19:51

## 2018-01-01 RX ADMIN — INSULIN LISPRO 3 UNITS: 100 INJECTION, SOLUTION INTRAVENOUS; SUBCUTANEOUS at 12:28

## 2018-01-01 RX ADMIN — MINERAL SUPPLEMENT IRON 300 MG / 5 ML STRENGTH LIQUID 100 PER BOX UNFLAVORED 300 MG: at 10:01

## 2018-01-01 RX ADMIN — INSULIN GLARGINE 10 UNITS: 100 INJECTION, SOLUTION SUBCUTANEOUS at 20:04

## 2018-01-01 RX ADMIN — INSULIN LISPRO 3 UNITS: 100 INJECTION, SOLUTION INTRAVENOUS; SUBCUTANEOUS at 16:52

## 2018-01-01 RX ADMIN — INSULIN LISPRO 3 UNITS: 100 INJECTION, SOLUTION INTRAVENOUS; SUBCUTANEOUS at 20:08

## 2018-01-01 RX ADMIN — MINERAL SUPPLEMENT IRON 300 MG / 5 ML STRENGTH LIQUID 100 PER BOX UNFLAVORED 300 MG: at 09:20

## 2018-01-01 RX ADMIN — DOCUSATE SODIUM 100 MG: 50 LIQUID ORAL at 08:24

## 2018-01-01 RX ADMIN — DEXAMETHASONE SODIUM PHOSPHATE 10 MG: 10 INJECTION, SOLUTION INTRAMUSCULAR; INTRAVENOUS at 16:00

## 2018-01-01 RX ADMIN — HEPARIN SODIUM 5000 UNITS: 10000 INJECTION, SOLUTION INTRAVENOUS; SUBCUTANEOUS at 13:54

## 2018-01-01 RX ADMIN — VALSARTAN 320 MG: 320 TABLET ORAL at 08:13

## 2018-01-01 RX ADMIN — MINERAL SUPPLEMENT IRON 300 MG / 5 ML STRENGTH LIQUID 100 PER BOX UNFLAVORED 300 MG: at 08:30

## 2018-01-01 RX ADMIN — SODIUM CHLORIDE 500 ML: 9 INJECTION, SOLUTION INTRAVENOUS at 10:58

## 2018-01-01 RX ADMIN — MINERAL SUPPLEMENT IRON 300 MG / 5 ML STRENGTH LIQUID 100 PER BOX UNFLAVORED 300 MG: at 20:57

## 2018-01-01 RX ADMIN — LABETALOL HYDROCHLORIDE 10 MG: 5 INJECTION, SOLUTION INTRAVENOUS at 03:45

## 2018-01-01 RX ADMIN — INSULIN GLARGINE 10 UNITS: 100 INJECTION, SOLUTION SUBCUTANEOUS at 21:21

## 2018-01-01 RX ADMIN — INSULIN LISPRO 3 UNITS: 100 INJECTION, SOLUTION INTRAVENOUS; SUBCUTANEOUS at 12:09

## 2018-01-01 RX ADMIN — GLYCOPYRROLATE 0.2 MG: 0.2 INJECTION INTRAMUSCULAR; INTRAVENOUS at 18:47

## 2018-01-01 RX ADMIN — MAGNESIUM GLUCONATE 500 MG ORAL TABLET 400 MG: 500 TABLET ORAL at 08:59

## 2018-01-01 RX ADMIN — LEVETIRACETAM 1000 MG: 10 INJECTION INTRAVENOUS at 11:23

## 2018-01-01 RX ADMIN — LORAZEPAM 1 MG: 2 INJECTION INTRAMUSCULAR; INTRAVENOUS at 21:23

## 2018-01-01 RX ADMIN — CHLORHEXIDINE GLUCONATE 15 ML: 1.2 RINSE ORAL at 20:44

## 2018-01-01 RX ADMIN — LABETALOL HYDROCHLORIDE 10 MG: 5 INJECTION, SOLUTION INTRAVENOUS at 08:45

## 2018-01-01 RX ADMIN — PANTOPRAZOLE SODIUM 40 MG: 40 INJECTION, POWDER, FOR SOLUTION INTRAVENOUS at 09:20

## 2018-01-01 RX ADMIN — INSULIN LISPRO 3 UNITS: 100 INJECTION, SOLUTION INTRAVENOUS; SUBCUTANEOUS at 19:53

## 2018-01-01 RX ADMIN — PANTOPRAZOLE SODIUM 40 MG: 40 INJECTION, POWDER, FOR SOLUTION INTRAVENOUS at 09:07

## 2018-01-01 RX ADMIN — DEXAMETHASONE SODIUM PHOSPHATE 10 MG: 10 INJECTION, SOLUTION INTRAMUSCULAR; INTRAVENOUS at 12:55

## 2018-01-01 RX ADMIN — ACETAMINOPHEN 650 MG: 650 SOLUTION ORAL at 16:37

## 2018-01-01 RX ADMIN — CHLORHEXIDINE GLUCONATE 15 ML: 1.2 RINSE ORAL at 08:12

## 2018-01-01 RX ADMIN — VALSARTAN 320 MG: 320 TABLET ORAL at 10:36

## 2018-01-01 RX ADMIN — LEVETIRACETAM 500 MG: 5 INJECTION INTRAVENOUS at 09:21

## 2018-01-01 RX ADMIN — DEXAMETHASONE SODIUM PHOSPHATE 10 MG: 10 INJECTION, SOLUTION INTRAMUSCULAR; INTRAVENOUS at 04:02

## 2018-01-01 RX ADMIN — Medication 1 MG: at 22:31

## 2018-01-01 RX ADMIN — DOCUSATE SODIUM 100 MG: 50 LIQUID ORAL at 09:21

## 2018-01-01 RX ADMIN — METOPROLOL TARTRATE 25 MG: 25 TABLET ORAL at 20:47

## 2018-01-01 RX ADMIN — Medication 20 MG: at 10:04

## 2018-01-01 RX ADMIN — VALSARTAN 320 MG: 320 TABLET ORAL at 08:35

## 2018-01-01 RX ADMIN — Medication 10 ML: at 09:41

## 2018-01-01 RX ADMIN — DONEPEZIL HYDROCHLORIDE 10 MG: 5 TABLET, FILM COATED ORAL at 21:48

## 2018-01-01 RX ADMIN — HYDROCHLOROTHIAZIDE 25 MG: 25 TABLET ORAL at 08:13

## 2018-01-01 RX ADMIN — DONEPEZIL HYDROCHLORIDE 10 MG: 5 TABLET, FILM COATED ORAL at 20:54

## 2018-01-01 RX ADMIN — METOPROLOL TARTRATE 25 MG: 25 TABLET ORAL at 20:35

## 2018-01-01 RX ADMIN — LEVETIRACETAM 1000 MG: 10 INJECTION INTRAVENOUS at 20:48

## 2018-01-01 RX ADMIN — HEPARIN SODIUM 5000 UNITS: 10000 INJECTION, SOLUTION INTRAVENOUS; SUBCUTANEOUS at 22:06

## 2018-01-01 RX ADMIN — MAGNESIUM GLUCONATE 500 MG ORAL TABLET 400 MG: 500 TABLET ORAL at 08:25

## 2018-01-01 RX ADMIN — VALSARTAN 320 MG: 320 TABLET ORAL at 11:27

## 2018-01-01 RX ADMIN — LEVETIRACETAM 1000 MG: 10 INJECTION INTRAVENOUS at 20:56

## 2018-01-01 RX ADMIN — INSULIN LISPRO 6 UNITS: 100 INJECTION, SOLUTION INTRAVENOUS; SUBCUTANEOUS at 16:32

## 2018-01-01 RX ADMIN — MINERAL SUPPLEMENT IRON 300 MG / 5 ML STRENGTH LIQUID 100 PER BOX UNFLAVORED 300 MG: at 20:08

## 2018-01-01 RX ADMIN — INSULIN LISPRO 3 UNITS: 100 INJECTION, SOLUTION INTRAVENOUS; SUBCUTANEOUS at 08:35

## 2018-01-01 RX ADMIN — Medication 1 MG: at 07:58

## 2018-01-01 RX ADMIN — HYDRALAZINE HYDROCHLORIDE 10 MG: 20 INJECTION INTRAMUSCULAR; INTRAVENOUS at 04:00

## 2018-01-01 RX ADMIN — Medication 10 ML: at 20:54

## 2018-01-01 RX ADMIN — LEVETIRACETAM 1000 MG: 5 INJECTION INTRAVASCULAR at 16:56

## 2018-01-01 RX ADMIN — Medication 10 ML: at 09:46

## 2018-01-01 RX ADMIN — CHLORHEXIDINE GLUCONATE 15 ML: 1.2 RINSE ORAL at 09:30

## 2018-01-01 RX ADMIN — LABETALOL HYDROCHLORIDE 10 MG: 5 INJECTION, SOLUTION INTRAVENOUS at 08:14

## 2018-01-01 RX ADMIN — LEVETIRACETAM 1000 MG: 10 INJECTION INTRAVENOUS at 09:05

## 2018-01-01 RX ADMIN — HYDROCHLOROTHIAZIDE 25 MG: 25 TABLET ORAL at 09:20

## 2018-01-01 RX ADMIN — VALSARTAN 320 MG: 320 TABLET ORAL at 09:57

## 2018-01-01 RX ADMIN — DONEPEZIL HYDROCHLORIDE 10 MG: 5 TABLET, FILM COATED ORAL at 20:56

## 2018-01-01 RX ADMIN — INSULIN LISPRO 3 UNITS: 100 INJECTION, SOLUTION INTRAVENOUS; SUBCUTANEOUS at 16:50

## 2018-01-01 RX ADMIN — CHLORHEXIDINE GLUCONATE 15 ML: 1.2 RINSE ORAL at 21:23

## 2018-01-01 RX ADMIN — INSULIN GLARGINE 10 UNITS: 100 INJECTION, SOLUTION SUBCUTANEOUS at 20:49

## 2018-01-01 RX ADMIN — MINERAL SUPPLEMENT IRON 300 MG / 5 ML STRENGTH LIQUID 100 PER BOX UNFLAVORED 300 MG: at 21:48

## 2018-01-01 RX ADMIN — MINERAL SUPPLEMENT IRON 300 MG / 5 ML STRENGTH LIQUID 100 PER BOX UNFLAVORED 300 MG: at 20:47

## 2018-01-01 RX ADMIN — MINERAL OIL AND WHITE PETROLATUM: 150; 830 OINTMENT OPHTHALMIC at 13:35

## 2018-01-01 RX ADMIN — HEPARIN SODIUM 5000 UNITS: 10000 INJECTION, SOLUTION INTRAVENOUS; SUBCUTANEOUS at 06:22

## 2018-01-01 RX ADMIN — ACETAMINOPHEN 650 MG: 325 TABLET, FILM COATED ORAL at 20:44

## 2018-01-01 RX ADMIN — INSULIN LISPRO 3 UNITS: 100 INJECTION, SOLUTION INTRAVENOUS; SUBCUTANEOUS at 09:15

## 2018-01-01 RX ADMIN — METOPROLOL TARTRATE 25 MG: 25 TABLET ORAL at 09:56

## 2018-01-01 RX ADMIN — MORPHINE SULFATE 1 MG/HR: 10 INJECTION INTRAVENOUS at 16:32

## 2018-01-01 RX ADMIN — LEVETIRACETAM 500 MG: 5 INJECTION INTRAVENOUS at 20:51

## 2018-01-01 RX ADMIN — Medication 10 ML: at 20:55

## 2018-01-01 RX ADMIN — LEVETIRACETAM 1500 MG: 15 INJECTION INTRAVENOUS at 09:30

## 2018-01-01 RX ADMIN — LEVETIRACETAM 1000 MG: 10 INJECTION INTRAVENOUS at 20:47

## 2018-01-01 RX ADMIN — Medication 40 MEQ: at 09:21

## 2018-01-01 RX ADMIN — Medication 40 MEQ: at 20:55

## 2018-01-01 RX ADMIN — DOCUSATE SODIUM 100 MG: 50 LIQUID ORAL at 08:12

## 2018-01-01 RX ADMIN — SODIUM CHLORIDE: 450 INJECTION, SOLUTION INTRAVENOUS at 00:13

## 2018-01-01 RX ADMIN — Medication 25 MG: at 20:51

## 2018-01-01 RX ADMIN — LABETALOL HYDROCHLORIDE 10 MG: 5 INJECTION, SOLUTION INTRAVENOUS at 02:15

## 2018-01-01 RX ADMIN — METOPROLOL TARTRATE 25 MG: 25 TABLET ORAL at 21:23

## 2018-01-01 RX ADMIN — INSULIN LISPRO 3 UNITS: 100 INJECTION, SOLUTION INTRAVENOUS; SUBCUTANEOUS at 08:46

## 2018-01-01 RX ADMIN — Medication 2.2 ML: at 12:12

## 2018-01-01 RX ADMIN — Medication 0.5 MG: at 08:19

## 2018-01-01 RX ADMIN — CHLORHEXIDINE GLUCONATE 15 ML: 1.2 RINSE ORAL at 09:23

## 2018-01-01 RX ADMIN — LEVETIRACETAM 1500 MG: 500 SOLUTION ORAL at 10:36

## 2018-01-01 RX ADMIN — MORPHINE SULFATE 7 MG/HR: 10 INJECTION INTRAVENOUS at 02:04

## 2018-01-01 RX ADMIN — METOPROLOL TARTRATE 25 MG: 25 TABLET ORAL at 20:18

## 2018-01-01 RX ADMIN — MINERAL SUPPLEMENT IRON 300 MG / 5 ML STRENGTH LIQUID 100 PER BOX UNFLAVORED 300 MG: at 20:48

## 2018-01-01 RX ADMIN — DOCUSATE SODIUM 100 MG: 50 LIQUID ORAL at 08:59

## 2018-01-01 RX ADMIN — HEPARIN SODIUM 5000 UNITS: 10000 INJECTION, SOLUTION INTRAVENOUS; SUBCUTANEOUS at 06:26

## 2018-01-01 RX ADMIN — DEXAMETHASONE SODIUM PHOSPHATE 10 MG: 10 INJECTION, SOLUTION INTRAMUSCULAR; INTRAVENOUS at 21:33

## 2018-01-01 RX ADMIN — HYDROCHLOROTHIAZIDE 25 MG: 25 TABLET ORAL at 09:06

## 2018-01-01 RX ADMIN — DEXAMETHASONE SODIUM PHOSPHATE 10 MG: 10 INJECTION, SOLUTION INTRAMUSCULAR; INTRAVENOUS at 15:51

## 2018-01-01 RX ADMIN — DEXAMETHASONE SODIUM PHOSPHATE 10 MG: 10 INJECTION, SOLUTION INTRAMUSCULAR; INTRAVENOUS at 20:19

## 2018-01-01 RX ADMIN — Medication 325 MG: at 17:37

## 2018-01-01 RX ADMIN — DOCUSATE SODIUM 100 MG: 50 LIQUID ORAL at 09:20

## 2018-01-01 RX ADMIN — DONEPEZIL HYDROCHLORIDE 10 MG: 5 TABLET, FILM COATED ORAL at 20:08

## 2018-01-01 RX ADMIN — HYDROCHLOROTHIAZIDE 25 MG: 25 TABLET ORAL at 11:28

## 2018-01-01 RX ADMIN — DONEPEZIL HYDROCHLORIDE 10 MG: 5 TABLET, FILM COATED ORAL at 21:23

## 2018-01-01 RX ADMIN — HEPARIN SODIUM 5000 UNITS: 10000 INJECTION, SOLUTION INTRAVENOUS; SUBCUTANEOUS at 21:00

## 2018-01-01 RX ADMIN — MAGNESIUM GLUCONATE 500 MG ORAL TABLET 400 MG: 500 TABLET ORAL at 09:24

## 2018-01-01 RX ADMIN — MINERAL SUPPLEMENT IRON 300 MG / 5 ML STRENGTH LIQUID 100 PER BOX UNFLAVORED 300 MG: at 09:23

## 2018-01-01 RX ADMIN — AMLODIPINE BESYLATE 2.5 MG: 2.5 TABLET ORAL at 08:35

## 2018-01-01 RX ADMIN — MINERAL SUPPLEMENT IRON 300 MG / 5 ML STRENGTH LIQUID 100 PER BOX UNFLAVORED 300 MG: at 08:59

## 2018-01-01 RX ADMIN — LEVETIRACETAM 1500 MG: 100 SOLUTION ORAL at 08:28

## 2018-01-01 RX ADMIN — HYDROCHLOROTHIAZIDE 25 MG: 25 TABLET ORAL at 11:24

## 2018-01-01 RX ADMIN — METOPROLOL TARTRATE 25 MG: 25 TABLET ORAL at 20:48

## 2018-01-01 RX ADMIN — MAGNESIUM GLUCONATE 500 MG ORAL TABLET 400 MG: 500 TABLET ORAL at 09:21

## 2018-01-01 RX ADMIN — LEVETIRACETAM 1000 MG: 10 INJECTION INTRAVENOUS at 09:21

## 2018-01-01 RX ADMIN — LEVETIRACETAM 1000 MG: 10 INJECTION INTRAVENOUS at 08:18

## 2018-01-01 RX ADMIN — CHLORHEXIDINE GLUCONATE 15 ML: 1.2 RINSE ORAL at 08:28

## 2018-01-01 RX ADMIN — INSULIN LISPRO 3 UNITS: 100 INJECTION, SOLUTION INTRAVENOUS; SUBCUTANEOUS at 00:45

## 2018-01-01 RX ADMIN — PANTOPRAZOLE SODIUM 40 MG: 40 INJECTION, POWDER, FOR SOLUTION INTRAVENOUS at 08:29

## 2018-01-01 RX ADMIN — HEPARIN SODIUM 5000 UNITS: 10000 INJECTION, SOLUTION INTRAVENOUS; SUBCUTANEOUS at 15:30

## 2018-01-01 RX ADMIN — CHLORHEXIDINE GLUCONATE 15 ML: 1.2 RINSE ORAL at 20:39

## 2018-01-01 RX ADMIN — HEPARIN 500 UNITS: 100 SYRINGE at 08:44

## 2018-01-01 RX ADMIN — MAGNESIUM GLUCONATE 500 MG ORAL TABLET 400 MG: 500 TABLET ORAL at 15:33

## 2018-01-01 RX ADMIN — INSULIN LISPRO 1 UNITS: 100 INJECTION, SOLUTION INTRAVENOUS; SUBCUTANEOUS at 13:54

## 2018-01-01 RX ADMIN — LEVETIRACETAM 1000 MG: 10 INJECTION INTRAVENOUS at 21:30

## 2018-01-01 RX ADMIN — AMLODIPINE BESYLATE 2.5 MG: 2.5 TABLET ORAL at 11:29

## 2018-01-01 RX ADMIN — VALSARTAN 320 MG: 320 TABLET ORAL at 09:24

## 2018-01-01 RX ADMIN — INSULIN LISPRO 3 UNITS: 100 INJECTION, SOLUTION INTRAVENOUS; SUBCUTANEOUS at 16:07

## 2018-01-01 RX ADMIN — DEXAMETHASONE SODIUM PHOSPHATE 10 MG: 10 INJECTION, SOLUTION INTRAMUSCULAR; INTRAVENOUS at 05:27

## 2018-01-01 RX ADMIN — METOPROLOL TARTRATE 50 MG: 50 TABLET, FILM COATED ORAL at 09:21

## 2018-01-01 RX ADMIN — INSULIN LISPRO 3 UNITS: 100 INJECTION, SOLUTION INTRAVENOUS; SUBCUTANEOUS at 16:00

## 2018-01-01 RX ADMIN — METOPROLOL TARTRATE 25 MG: 25 TABLET ORAL at 09:41

## 2018-01-01 RX ADMIN — HEPARIN SODIUM 5000 UNITS: 10000 INJECTION, SOLUTION INTRAVENOUS; SUBCUTANEOUS at 06:30

## 2018-01-01 RX ADMIN — Medication 10 ML: at 10:02

## 2018-01-01 RX ADMIN — CHLORHEXIDINE GLUCONATE 15 ML: 1.2 RINSE ORAL at 21:33

## 2018-01-01 RX ADMIN — HYDROCHLOROTHIAZIDE 25 MG: 25 TABLET ORAL at 08:45

## 2018-01-01 RX ADMIN — MAGNESIUM GLUCONATE 500 MG ORAL TABLET 400 MG: 500 TABLET ORAL at 09:06

## 2018-01-01 RX ADMIN — INSULIN LISPRO 3 UNITS: 100 INJECTION, SOLUTION INTRAVENOUS; SUBCUTANEOUS at 04:11

## 2018-01-01 RX ADMIN — DEXAMETHASONE SODIUM PHOSPHATE 10 MG: 10 INJECTION, SOLUTION INTRAMUSCULAR; INTRAVENOUS at 08:28

## 2018-01-01 RX ADMIN — HYDROCHLOROTHIAZIDE 25 MG: 25 TABLET ORAL at 09:21

## 2018-01-01 RX ADMIN — MINERAL SUPPLEMENT IRON 300 MG / 5 ML STRENGTH LIQUID 100 PER BOX UNFLAVORED 300 MG: at 21:13

## 2018-01-01 RX ADMIN — LABETALOL HYDROCHLORIDE 10 MG: 5 INJECTION, SOLUTION INTRAVENOUS at 12:27

## 2018-01-01 RX ADMIN — HEPARIN SODIUM 5000 UNITS: 10000 INJECTION, SOLUTION INTRAVENOUS; SUBCUTANEOUS at 14:35

## 2018-01-01 RX ADMIN — INSULIN LISPRO 3 UNITS: 100 INJECTION, SOLUTION INTRAVENOUS; SUBCUTANEOUS at 12:22

## 2018-01-01 RX ADMIN — LABETALOL HYDROCHLORIDE 10 MG: 5 INJECTION, SOLUTION INTRAVENOUS at 12:34

## 2018-01-01 RX ADMIN — LEVETIRACETAM 1500 MG: 100 SOLUTION ORAL at 20:57

## 2018-01-01 RX ADMIN — METOPROLOL TARTRATE 25 MG: 25 TABLET ORAL at 20:56

## 2018-01-01 RX ADMIN — CHLORHEXIDINE GLUCONATE 15 ML: 1.2 RINSE ORAL at 09:56

## 2018-01-01 RX ADMIN — Medication 10 ML: at 09:26

## 2018-01-01 RX ADMIN — HEPARIN SODIUM 5000 UNITS: 10000 INJECTION, SOLUTION INTRAVENOUS; SUBCUTANEOUS at 06:32

## 2018-01-01 RX ADMIN — INSULIN LISPRO 3 UNITS: 100 INJECTION, SOLUTION INTRAVENOUS; SUBCUTANEOUS at 00:15

## 2018-01-01 RX ADMIN — MAGNESIUM GLUCONATE 500 MG ORAL TABLET 400 MG: 500 TABLET ORAL at 09:44

## 2018-01-01 RX ADMIN — DEXAMETHASONE SODIUM PHOSPHATE 10 MG: 10 INJECTION, SOLUTION INTRAMUSCULAR; INTRAVENOUS at 20:35

## 2018-01-01 RX ADMIN — AMLODIPINE BESYLATE 2.5 MG: 2.5 TABLET ORAL at 09:57

## 2018-01-01 RX ADMIN — INSULIN LISPRO 3 UNITS: 100 INJECTION, SOLUTION INTRAVENOUS; SUBCUTANEOUS at 19:54

## 2018-01-01 RX ADMIN — OXYCODONE HYDROCHLORIDE 5 MG: 5 TABLET ORAL at 13:14

## 2018-01-01 RX ADMIN — METOPROLOL TARTRATE 25 MG: 25 TABLET ORAL at 08:45

## 2018-01-01 RX ADMIN — INSULIN LISPRO 6 UNITS: 100 INJECTION, SOLUTION INTRAVENOUS; SUBCUTANEOUS at 15:52

## 2018-01-01 RX ADMIN — Medication 0.5 MG: at 03:04

## 2018-01-01 RX ADMIN — AMLODIPINE BESYLATE 2.5 MG: 2.5 TABLET ORAL at 08:51

## 2018-01-01 RX ADMIN — HYDROCHLOROTHIAZIDE 25 MG: 25 TABLET ORAL at 08:35

## 2018-01-01 RX ADMIN — LEVETIRACETAM 500 MG: 5 INJECTION INTRAVENOUS at 08:45

## 2018-01-01 RX ADMIN — LABETALOL HYDROCHLORIDE 10 MG: 5 INJECTION, SOLUTION INTRAVENOUS at 00:10

## 2018-01-01 RX ADMIN — HEPARIN SODIUM 5000 UNITS: 10000 INJECTION, SOLUTION INTRAVENOUS; SUBCUTANEOUS at 21:54

## 2018-01-01 RX ADMIN — HEPARIN SODIUM 5000 UNITS: 10000 INJECTION, SOLUTION INTRAVENOUS; SUBCUTANEOUS at 22:09

## 2018-01-01 RX ADMIN — TRAZODONE HYDROCHLORIDE 50 MG: 50 TABLET ORAL at 20:51

## 2018-01-01 RX ADMIN — LEVETIRACETAM 1500 MG: 15 INJECTION INTRAVENOUS at 21:23

## 2018-01-01 RX ADMIN — TRAZODONE HYDROCHLORIDE 50 MG: 50 TABLET ORAL at 20:54

## 2018-01-01 RX ADMIN — MINERAL SUPPLEMENT IRON 300 MG / 5 ML STRENGTH LIQUID 100 PER BOX UNFLAVORED 300 MG: at 09:41

## 2018-01-01 RX ADMIN — Medication 10 ML: at 21:30

## 2018-01-01 RX ADMIN — LEVETIRACETAM 1000 MG: 10 INJECTION INTRAVENOUS at 09:04

## 2018-01-01 RX ADMIN — METOPROLOL TARTRATE 25 MG: 25 TABLET ORAL at 09:24

## 2018-01-01 RX ADMIN — CHLORHEXIDINE GLUCONATE 15 ML: 1.2 RINSE ORAL at 08:27

## 2018-01-01 RX ADMIN — MINERAL SUPPLEMENT IRON 300 MG / 5 ML STRENGTH LIQUID 100 PER BOX UNFLAVORED 300 MG: at 21:23

## 2018-01-01 RX ADMIN — DONEPEZIL HYDROCHLORIDE 10 MG: 5 TABLET, FILM COATED ORAL at 21:14

## 2018-01-01 RX ADMIN — Medication 20 MG: at 10:27

## 2018-01-01 RX ADMIN — ACETAMINOPHEN 650 MG: 650 SOLUTION ORAL at 20:06

## 2018-01-01 RX ADMIN — METOPROLOL TARTRATE 50 MG: 50 TABLET, FILM COATED ORAL at 11:28

## 2018-01-01 RX ADMIN — LABETALOL HYDROCHLORIDE 10 MG: 5 INJECTION, SOLUTION INTRAVENOUS at 00:59

## 2018-01-01 RX ADMIN — LEVETIRACETAM 500 MG: 5 INJECTION INTRAVENOUS at 08:30

## 2018-01-01 RX ADMIN — CHLORHEXIDINE GLUCONATE 15 ML: 1.2 RINSE ORAL at 08:56

## 2018-01-01 RX ADMIN — LABETALOL HYDROCHLORIDE 10 MG: 5 INJECTION, SOLUTION INTRAVENOUS at 01:57

## 2018-01-01 RX ADMIN — DOCUSATE SODIUM 100 MG: 50 LIQUID ORAL at 09:41

## 2018-01-01 RX ADMIN — Medication 10 ML: at 08:28

## 2018-01-01 RX ADMIN — CHLORHEXIDINE GLUCONATE 15 ML: 1.2 RINSE ORAL at 20:58

## 2018-01-01 RX ADMIN — LABETALOL HYDROCHLORIDE 10 MG: 5 INJECTION, SOLUTION INTRAVENOUS at 18:33

## 2018-01-01 RX ADMIN — LABETALOL HYDROCHLORIDE 10 MG: 5 INJECTION, SOLUTION INTRAVENOUS at 18:28

## 2018-01-01 RX ADMIN — DONEPEZIL HYDROCHLORIDE 10 MG: 5 TABLET, FILM COATED ORAL at 20:44

## 2018-01-01 RX ADMIN — INSULIN LISPRO 3 UNITS: 100 INJECTION, SOLUTION INTRAVENOUS; SUBCUTANEOUS at 00:29

## 2018-01-01 RX ADMIN — AMLODIPINE BESYLATE 2.5 MG: 2.5 TABLET ORAL at 09:00

## 2018-01-01 RX ADMIN — INSULIN LISPRO 3 UNITS: 100 INJECTION, SOLUTION INTRAVENOUS; SUBCUTANEOUS at 12:34

## 2018-01-01 RX ADMIN — Medication 10 ML: at 20:58

## 2018-01-01 RX ADMIN — INSULIN LISPRO 3 UNITS: 100 INJECTION, SOLUTION INTRAVENOUS; SUBCUTANEOUS at 20:17

## 2018-01-01 RX ADMIN — INSULIN LISPRO 3 UNITS: 100 INJECTION, SOLUTION INTRAVENOUS; SUBCUTANEOUS at 00:18

## 2018-01-01 RX ADMIN — DOCUSATE SODIUM 100 MG: 50 LIQUID ORAL at 08:45

## 2018-01-01 RX ADMIN — METOPROLOL TARTRATE 25 MG: 25 TABLET ORAL at 20:44

## 2018-01-01 RX ADMIN — MAGNESIUM GLUCONATE 500 MG ORAL TABLET 400 MG: 500 TABLET ORAL at 09:41

## 2018-01-01 RX ADMIN — INSULIN LISPRO 3 UNITS: 100 INJECTION, SOLUTION INTRAVENOUS; SUBCUTANEOUS at 13:15

## 2018-01-01 RX ADMIN — ACETAMINOPHEN 650 MG: 325 TABLET, FILM COATED ORAL at 08:42

## 2018-01-01 RX ADMIN — PANTOPRAZOLE SODIUM 40 MG: 40 INJECTION, POWDER, FOR SOLUTION INTRAVENOUS at 08:18

## 2018-01-01 RX ADMIN — MINERAL SUPPLEMENT IRON 300 MG / 5 ML STRENGTH LIQUID 100 PER BOX UNFLAVORED 300 MG: at 21:25

## 2018-01-01 RX ADMIN — Medication 10 ML: at 20:49

## 2018-01-01 RX ADMIN — HEPARIN SODIUM 5000 UNITS: 10000 INJECTION, SOLUTION INTRAVENOUS; SUBCUTANEOUS at 05:27

## 2018-01-01 RX ADMIN — MINERAL SUPPLEMENT IRON 300 MG / 5 ML STRENGTH LIQUID 100 PER BOX UNFLAVORED 300 MG: at 20:20

## 2018-01-01 RX ADMIN — METOPROLOL TARTRATE 25 MG: 25 TABLET ORAL at 09:20

## 2018-01-01 RX ADMIN — PANTOPRAZOLE SODIUM 40 MG: 40 INJECTION, POWDER, FOR SOLUTION INTRAVENOUS at 09:46

## 2018-01-01 RX ADMIN — MINERAL SUPPLEMENT IRON 300 MG / 5 ML STRENGTH LIQUID 100 PER BOX UNFLAVORED 300 MG: at 08:24

## 2018-01-01 RX ADMIN — INSULIN LISPRO 3 UNITS: 100 INJECTION, SOLUTION INTRAVENOUS; SUBCUTANEOUS at 20:18

## 2018-01-01 RX ADMIN — Medication 10 ML: at 16:54

## 2018-01-01 RX ADMIN — VALSARTAN 320 MG: 320 TABLET ORAL at 09:21

## 2018-01-01 RX ADMIN — DEXAMETHASONE SODIUM PHOSPHATE 4 MG: 4 INJECTION, SOLUTION INTRA-ARTICULAR; INTRALESIONAL; INTRAMUSCULAR; INTRAVENOUS; SOFT TISSUE at 08:44

## 2018-01-01 RX ADMIN — HEPARIN 500 UNITS: 100 SYRINGE at 16:55

## 2018-01-01 RX ADMIN — LABETALOL HYDROCHLORIDE 10 MG: 5 INJECTION, SOLUTION INTRAVENOUS at 14:03

## 2018-01-01 RX ADMIN — MAGNESIUM GLUCONATE 500 MG ORAL TABLET 400 MG: 500 TABLET ORAL at 08:45

## 2018-01-01 RX ADMIN — DEXAMETHASONE SODIUM PHOSPHATE 10 MG: 10 INJECTION, SOLUTION INTRAMUSCULAR; INTRAVENOUS at 13:24

## 2018-01-01 RX ADMIN — DOCUSATE SODIUM 100 MG: 50 LIQUID ORAL at 10:01

## 2018-01-01 RX ADMIN — HYDROCHLOROTHIAZIDE 25 MG: 25 TABLET ORAL at 09:57

## 2018-01-01 RX ADMIN — Medication 10 ML: at 20:39

## 2018-01-01 RX ADMIN — DOCUSATE SODIUM 100 MG: 50 LIQUID ORAL at 15:50

## 2018-01-01 RX ADMIN — Medication 40 MEQ: at 15:33

## 2018-01-01 RX ADMIN — Medication 10 ML: at 09:05

## 2018-01-01 RX ADMIN — AMLODIPINE BESYLATE 2.5 MG: 2.5 TABLET ORAL at 09:20

## 2018-01-01 RX ADMIN — Medication 10 ML: at 08:27

## 2018-01-01 RX ADMIN — INSULIN GLARGINE 10 UNITS: 100 INJECTION, SOLUTION SUBCUTANEOUS at 20:47

## 2018-01-01 RX ADMIN — Medication 325 MG: at 08:30

## 2018-01-01 RX ADMIN — LEVETIRACETAM 1500 MG: 15 INJECTION INTRAVENOUS at 20:45

## 2018-01-01 RX ADMIN — Medication 10 ML: at 09:21

## 2018-01-01 RX ADMIN — Medication 10 ML: at 20:48

## 2018-01-01 RX ADMIN — INSULIN LISPRO 3 UNITS: 100 INJECTION, SOLUTION INTRAVENOUS; SUBCUTANEOUS at 23:38

## 2018-01-01 RX ADMIN — INSULIN LISPRO 3 UNITS: 100 INJECTION, SOLUTION INTRAVENOUS; SUBCUTANEOUS at 09:21

## 2018-01-01 RX ADMIN — METOPROLOL TARTRATE 25 MG: 25 TABLET ORAL at 08:59

## 2018-01-01 RX ADMIN — Medication 10 ML: at 12:10

## 2018-01-01 RX ADMIN — PANTOPRAZOLE SODIUM 40 MG: 40 INJECTION, POWDER, FOR SOLUTION INTRAVENOUS at 09:41

## 2018-01-01 RX ADMIN — INSULIN LISPRO 3 UNITS: 100 INJECTION, SOLUTION INTRAVENOUS; SUBCUTANEOUS at 13:35

## 2018-01-01 RX ADMIN — AMLODIPINE BESYLATE 2.5 MG: 2.5 TABLET ORAL at 09:05

## 2018-01-01 RX ADMIN — INSULIN LISPRO 6 UNITS: 100 INJECTION, SOLUTION INTRAVENOUS; SUBCUTANEOUS at 20:30

## 2018-01-01 RX ADMIN — LEVETIRACETAM 500 MG: 5 INJECTION INTRAVENOUS at 20:55

## 2018-01-01 RX ADMIN — DONEPEZIL HYDROCHLORIDE 10 MG: 5 TABLET, FILM COATED ORAL at 20:20

## 2018-01-01 RX ADMIN — VALSARTAN 320 MG: 320 TABLET ORAL at 08:59

## 2018-01-01 RX ADMIN — METOPROLOL TARTRATE 25 MG: 25 TABLET ORAL at 20:08

## 2018-01-01 RX ADMIN — CHLORHEXIDINE GLUCONATE 15 ML: 1.2 RINSE ORAL at 09:05

## 2018-01-01 RX ADMIN — INSULIN LISPRO 3 UNITS: 100 INJECTION, SOLUTION INTRAVENOUS; SUBCUTANEOUS at 16:29

## 2018-01-01 RX ADMIN — MAGNESIUM GLUCONATE 500 MG ORAL TABLET 400 MG: 500 TABLET ORAL at 10:01

## 2018-01-01 RX ADMIN — ACETAMINOPHEN 650 MG: 650 SOLUTION ORAL at 23:49

## 2018-01-01 RX ADMIN — DONEPEZIL HYDROCHLORIDE 10 MG: 5 TABLET, FILM COATED ORAL at 20:51

## 2018-01-01 RX ADMIN — INSULIN LISPRO 3 UNITS: 100 INJECTION, SOLUTION INTRAVENOUS; SUBCUTANEOUS at 08:41

## 2018-01-01 RX ADMIN — PANTOPRAZOLE SODIUM 40 MG: 40 INJECTION, POWDER, FOR SOLUTION INTRAVENOUS at 09:04

## 2018-01-01 RX ADMIN — MAGNESIUM GLUCONATE 500 MG ORAL TABLET 400 MG: 500 TABLET ORAL at 08:35

## 2018-01-01 RX ADMIN — CHLORHEXIDINE GLUCONATE 15 ML: 1.2 RINSE ORAL at 08:45

## 2018-01-01 RX ADMIN — HEPARIN SODIUM 5000 UNITS: 10000 INJECTION, SOLUTION INTRAVENOUS; SUBCUTANEOUS at 14:15

## 2018-01-01 RX ADMIN — DONEPEZIL HYDROCHLORIDE 10 MG: 5 TABLET, FILM COATED ORAL at 20:48

## 2018-01-01 ASSESSMENT — PAIN SCALES - PAIN ASSESSMENT IN ADVANCED DEMENTIA (PAINAD)
BREATHING: 0
NEGVOCALIZATION: 0
BODYLANGUAGE: 0
CONSOLABILITY: 0
NEGVOCALIZATION: 0
NEGVOCALIZATION: 0
BODYLANGUAGE: 0
CONSOLABILITY: 0
FACIALEXPRESSION: 0
BODYLANGUAGE: 0
TOTALSCORE: 0
NEGVOCALIZATION: 0
NEGVOCALIZATION: 0
TOTALSCORE: 0
BODYLANGUAGE: 0
NEGVOCALIZATION: 0
CONSOLABILITY: 0
NEGVOCALIZATION: 0
CONSOLABILITY: 0
TOTALSCORE: 0
BODYLANGUAGE: 0
CONSOLABILITY: 0
TOTALSCORE: 0
CONSOLABILITY: 0
BREATHING: 0
BREATHING: 0
CONSOLABILITY: 0
FACIALEXPRESSION: 0
CONSOLABILITY: 0
NEGVOCALIZATION: 0
TOTALSCORE: 0
FACIALEXPRESSION: 0
CONSOLABILITY: 0
CONSOLABILITY: 0
NEGVOCALIZATION: 0
FACIALEXPRESSION: 0
BODYLANGUAGE: 0
FACIALEXPRESSION: 0
FACIALEXPRESSION: 0
NEGVOCALIZATION: 0
BODYLANGUAGE: 0
FACIALEXPRESSION: 0
FACIALEXPRESSION: 0
TOTALSCORE: 4
BREATHING: 0
TOTALSCORE: 0
NEGVOCALIZATION: 0
FACIALEXPRESSION: 0
FACIALEXPRESSION: 1
CONSOLABILITY: 0
FACIALEXPRESSION: 0
NEGVOCALIZATION: 0
NEGVOCALIZATION: 0
FACIALEXPRESSION: 0
BREATHING: 0
TOTALSCORE: 0
BREATHING: 0
TOTALSCORE: 0
CONSOLABILITY: 0
BODYLANGUAGE: 0
CONSOLABILITY: 0
BREATHING: 0
TOTALSCORE: 0
BODYLANGUAGE: 0
BREATHING: 0
CONSOLABILITY: 0
TOTALSCORE: 0
CONSOLABILITY: 0
BODYLANGUAGE: 0
FACIALEXPRESSION: 0
NEGVOCALIZATION: 0
NEGVOCALIZATION: 0
CONSOLABILITY: 0
FACIALEXPRESSION: 2
BODYLANGUAGE: 0
NEGVOCALIZATION: 0
BREATHING: 0
CONSOLABILITY: 0
TOTALSCORE: 0
TOTALSCORE: 5
NEGVOCALIZATION: 0
CONSOLABILITY: 0
BODYLANGUAGE: 0
BREATHING: 0
CONSOLABILITY: 0
TOTALSCORE: 0
BREATHING: 0
BREATHING: 0
BODYLANGUAGE: 0
FACIALEXPRESSION: 0
NEGVOCALIZATION: 0
TOTALSCORE: 0
FACIALEXPRESSION: 0
TOTALSCORE: 0
CONSOLABILITY: 0
BREATHING: 0
NEGVOCALIZATION: 0
FACIALEXPRESSION: 0
FACIALEXPRESSION: 0
CONSOLABILITY: 0
BODYLANGUAGE: 0
BODYLANGUAGE: 0
CONSOLABILITY: 0
BREATHING: 0
BREATHING: 0
NEGVOCALIZATION: 0
BREATHING: 0
NEGVOCALIZATION: 0
BREATHING: 0
BREATHING: 0
BODYLANGUAGE: 0
BODYLANGUAGE: 0
FACIALEXPRESSION: 0
BODYLANGUAGE: 0
BREATHING: 1
FACIALEXPRESSION: 0
BREATHING: 0
TOTALSCORE: 0
BREATHING: 0
NEGVOCALIZATION: 0
TOTALSCORE: 0
BODYLANGUAGE: 0
CONSOLABILITY: 0
FACIALEXPRESSION: 0
TOTALSCORE: 0
BODYLANGUAGE: 0
NEGVOCALIZATION: 0
BODYLANGUAGE: 0
TOTALSCORE: 0
TOTALSCORE: 0
BREATHING: 0
BREATHING: 0
CONSOLABILITY: 0
BODYLANGUAGE: 0
FACIALEXPRESSION: 0
BREATHING: 0
BODYLANGUAGE: 0
FACIALEXPRESSION: 0
TOTALSCORE: 0
NEGVOCALIZATION: 0
BODYLANGUAGE: 0
TOTALSCORE: 0
BODYLANGUAGE: 0
BREATHING: 0
BODYLANGUAGE: 0
TOTALSCORE: 0
NEGVOCALIZATION: 0
BREATHING: 0
NEGVOCALIZATION: 0
BREATHING: 0
BREATHING: 0
NEGVOCALIZATION: 0
NEGVOCALIZATION: 0
TOTALSCORE: 0
TOTALSCORE: 0
BODYLANGUAGE: 0
NEGVOCALIZATION: 0
FACIALEXPRESSION: 0
BODYLANGUAGE: 0
FACIALEXPRESSION: 0
TOTALSCORE: 0
FACIALEXPRESSION: 0
FACIALEXPRESSION: 0
BREATHING: 0
BREATHING: 0
CONSOLABILITY: 0
FACIALEXPRESSION: 0
BREATHING: 0
NEGVOCALIZATION: 1
NEGVOCALIZATION: 0
BODYLANGUAGE: 0
FACIALEXPRESSION: 0
CONSOLABILITY: 0
TOTALSCORE: 0
NEGVOCALIZATION: 0
BODYLANGUAGE: 0
NEGVOCALIZATION: 0
BODYLANGUAGE: 0
BREATHING: 0
NEGVOCALIZATION: 0
BREATHING: 0
NEGVOCALIZATION: 0
CONSOLABILITY: 0
FACIALEXPRESSION: 0
TOTALSCORE: 0
CONSOLABILITY: 0
NEGVOCALIZATION: 0
CONSOLABILITY: 0
CONSOLABILITY: 0
NEGVOCALIZATION: 0
FACIALEXPRESSION: 0
NEGVOCALIZATION: 0
FACIALEXPRESSION: 0
BREATHING: 0
TOTALSCORE: 0
BODYLANGUAGE: 0
NEGVOCALIZATION: 0
BREATHING: 0
BREATHING: 0
BODYLANGUAGE: 0
BREATHING: 0
BREATHING: 0
TOTALSCORE: 0
TOTALSCORE: 0
BODYLANGUAGE: 0
FACIALEXPRESSION: 0
BREATHING: 0
NEGVOCALIZATION: 0
CONSOLABILITY: 1
CONSOLABILITY: 0
NEGVOCALIZATION: 0
BREATHING: 0
FACIALEXPRESSION: 0
TOTALSCORE: 0
BREATHING: 0
FACIALEXPRESSION: 0
BODYLANGUAGE: 0
TOTALSCORE: 0
BODYLANGUAGE: 0
FACIALEXPRESSION: 0
NEGVOCALIZATION: 0
TOTALSCORE: 0
FACIALEXPRESSION: 0
CONSOLABILITY: 0
CONSOLABILITY: 1
BREATHING: 0
BODYLANGUAGE: 0
BODYLANGUAGE: 0
NEGVOCALIZATION: 0
CONSOLABILITY: 0
TOTALSCORE: 0
NEGVOCALIZATION: 0
FACIALEXPRESSION: 0
NEGVOCALIZATION: 0
CONSOLABILITY: 0
BREATHING: 0
BREATHING: 0
NEGVOCALIZATION: 1
FACIALEXPRESSION: 0
CONSOLABILITY: 0
BREATHING: 0
BREATHING: 0
FACIALEXPRESSION: 0
TOTALSCORE: 0
CONSOLABILITY: 0
CONSOLABILITY: 0
BODYLANGUAGE: 0
CONSOLABILITY: 0
FACIALEXPRESSION: 0
TOTALSCORE: 0
FACIALEXPRESSION: 0
FACIALEXPRESSION: 0
TOTALSCORE: 0
CONSOLABILITY: 0
TOTALSCORE: 0
FACIALEXPRESSION: 0
NEGVOCALIZATION: 0
BODYLANGUAGE: 0
BODYLANGUAGE: 0
FACIALEXPRESSION: 0
FACIALEXPRESSION: 0
BODYLANGUAGE: 0
FACIALEXPRESSION: 0
BODYLANGUAGE: 0
CONSOLABILITY: 0
BREATHING: 0
NEGVOCALIZATION: 0
FACIALEXPRESSION: 0
TOTALSCORE: 0
TOTALSCORE: 0
NEGVOCALIZATION: 0
CONSOLABILITY: 0
FACIALEXPRESSION: 0
BODYLANGUAGE: 0
NEGVOCALIZATION: 0
BODYLANGUAGE: 0
CONSOLABILITY: 0
BODYLANGUAGE: 0
BODYLANGUAGE: 0
NEGVOCALIZATION: 0
CONSOLABILITY: 0
NEGVOCALIZATION: 0
CONSOLABILITY: 0
BREATHING: 0
BODYLANGUAGE: 0
TOTALSCORE: 0
NEGVOCALIZATION: 0
BREATHING: 0
BODYLANGUAGE: 0
TOTALSCORE: 0
CONSOLABILITY: 0
BODYLANGUAGE: 0
TOTALSCORE: 0
TOTALSCORE: 0
BREATHING: 0
BREATHING: 0
BODYLANGUAGE: 0
CONSOLABILITY: 0
TOTALSCORE: 0
BODYLANGUAGE: 0
TOTALSCORE: 0
BREATHING: 0
CONSOLABILITY: 0
CONSOLABILITY: 0
TOTALSCORE: 0
FACIALEXPRESSION: 0
NEGVOCALIZATION: 0
FACIALEXPRESSION: 0
BODYLANGUAGE: 0
FACIALEXPRESSION: 0
CONSOLABILITY: 0
FACIALEXPRESSION: 0
TOTALSCORE: 0
FACIALEXPRESSION: 0
CONSOLABILITY: 0
BODYLANGUAGE: 0
TOTALSCORE: 0
BREATHING: 0
TOTALSCORE: 0
CONSOLABILITY: 0
FACIALEXPRESSION: 0
NEGVOCALIZATION: 0
FACIALEXPRESSION: 0
CONSOLABILITY: 0
BODYLANGUAGE: 0
CONSOLABILITY: 0
NEGVOCALIZATION: 0
BREATHING: 0
CONSOLABILITY: 0
BODYLANGUAGE: 0
TOTALSCORE: 0
CONSOLABILITY: 0
CONSOLABILITY: 0
BODYLANGUAGE: 1
FACIALEXPRESSION: 0
CONSOLABILITY: 0
NEGVOCALIZATION: 0
TOTALSCORE: 0
TOTALSCORE: 0
BODYLANGUAGE: 0
BODYLANGUAGE: 0
FACIALEXPRESSION: 0
FACIALEXPRESSION: 0
BREATHING: 0

## 2018-01-01 ASSESSMENT — PULMONARY FUNCTION TESTS
PIF_VALUE: 15
PIF_VALUE: 16
PIF_VALUE: 15
PIF_VALUE: 16
PIF_VALUE: 15
PIF_VALUE: 16
PIF_VALUE: 15
PIF_VALUE: 16
PIF_VALUE: 15
PIF_VALUE: 16
PIF_VALUE: 15
PIF_VALUE: 16
PIF_VALUE: 15
PIF_VALUE: 16
PIF_VALUE: 15
PIF_VALUE: 16
PIF_VALUE: 15
PIF_VALUE: 16
PIF_VALUE: 16
PIF_VALUE: 15
PIF_VALUE: 14
PIF_VALUE: 15
PIF_VALUE: 16
PIF_VALUE: 15
PIF_VALUE: 14
PIF_VALUE: 15
PIF_VALUE: 33
PIF_VALUE: 15
PIF_VALUE: 15
PIF_VALUE: 16
PIF_VALUE: 15
PIF_VALUE: 16
PIF_VALUE: 15
PIF_VALUE: 16
PIF_VALUE: 15
PIF_VALUE: 13
PIF_VALUE: 15
PIF_VALUE: 16
PIF_VALUE: 15
PIF_VALUE: 16
PIF_VALUE: 15
PIF_VALUE: 16
PIF_VALUE: 15
PIF_VALUE: 16
PIF_VALUE: 15
PIF_VALUE: 16
PIF_VALUE: 15
PIF_VALUE: 16
PIF_VALUE: 15
PIF_VALUE: 16
PIF_VALUE: 15
PIF_VALUE: 16
PIF_VALUE: 15
PIF_VALUE: 17
PIF_VALUE: 15
PIF_VALUE: 16
PIF_VALUE: 15
PIF_VALUE: 16
PIF_VALUE: 15
PIF_VALUE: 16
PIF_VALUE: 15
PIF_VALUE: 24
PIF_VALUE: 15
PIF_VALUE: 16
PIF_VALUE: 15
PIF_VALUE: 16
PIF_VALUE: 15
PIF_VALUE: 32
PIF_VALUE: 16
PIF_VALUE: 15

## 2018-01-01 ASSESSMENT — PAIN SCALES - WONG BAKER
WONGBAKER_NUMERICALRESPONSE: 0

## 2018-01-01 ASSESSMENT — PAIN SCALES - GENERAL
PAINLEVEL_OUTOF10: 7
PAINLEVEL_OUTOF10: 0
PAINLEVEL_OUTOF10: 5
PAINLEVEL_OUTOF10: 0

## 2018-03-06 PROBLEM — S06.5XAA ACUTE SUBDURAL HEMATOMA: Status: ACTIVE | Noted: 2018-01-01

## 2018-03-06 NOTE — ED PROVIDER NOTES
Patient is a 64-year-old female with a history of MRDD presented by EMS from nursing home for reported alteration from baseline mental status, emesis x1. Her ED nursing staff, did not receive a nurse to nurse report regarding history of present illness at this time. Patient is non-verbal and non-participatory in her care. Per EMS report she will normally have yelling or incomprehensible speech to simulation. The history is provided by the EMS personnel. Review of Systems   Unable to perform ROS: Patient nonverbal       Physical Exam   Constitutional: She appears well-nourished. She appears lethargic. No distress. Patient leaning to the right, head down, resists attempts to sit patient upright. Does not appear to be in acute distress at this time. She does moan and mumble to tactile stimulation. HENT:   Head: Normocephalic and atraumatic. Eyes: Pupils are equal, round, and reactive to light. Neck:   Non-compliant, no withdrawal from pain on neck palpation   Cardiovascular: Normal rate, regular rhythm and intact distal pulses. Murmur (2/6 systolic) heard. Pulmonary/Chest: Effort normal. No respiratory distress. She has no wheezes. She has no rales. Decreased LS in right lower base compared to left. Respirations non-labored, equal air entry bilaterally. Abdominal: Soft. Bowel sounds are normal. She exhibits no distension. There is no tenderness. Musculoskeletal: She exhibits no edema or deformity. Limited examination due to patient compliance. Arms flexed at elbows. Neurological: She appears lethargic. GCS eye subscore is 2. GCS verbal subscore is 2. GCS motor subscore is 4. Patient reported non-verbal. Limited exam due to patient compliance and baseline status.        Procedures    [facility]    ED Course        --------------------------------------------- PAST HISTORY ---------------------------------------------  Past Medical History:  has a past medical history of Breast CA ([facility] [address] and anterior falx cerebri. Significant associated mass effect. Effacement of the right lateral and third ventricles and possible   entrapment of the left lateral ventricle. Leftward midline shift up to   12 mm with trans-falcine herniation. The findings were discussed with Dr. Kelley Becerra of the [facility] ED at approximately 1830 hours on 3/6/2018. XR CHEST PORTABLE   Final Result   Limited examination. EKG:  This EKG is signed and interpreted by me. Rate: 73  Rhythm: Sinus  Interpretation: Sinus rhythm, left anterior fascicular block  Comparison: stable as compared to patient's most recent EKG    ------------------------- NURSING NOTES AND VITALS REVIEWED ---------------------------  Date / Time Roomed:  3/6/2018  8:42 PM  ED Bed Assignment:  [id number]    The nursing notes within the ED encounter and vital signs as below have been reviewed. Patient Vitals for the past 24 hrs:   BP Temp Pulse Resp SpO2   03/06/18 1942 (!) 169/100 - 73 14 97 %   03/06/18 1638 (!) 173/95 96.7 °F (35.9 °C) 72 12 98 %       Oxygen Saturation Interpretation: Normal    ------------------------------------------ PROGRESS NOTES ------------------------------------------  Re-evaluation(s):  5:28 PM: Attending updated. Case and plan discussed. 6:46 PM: Discussed findings of CT. Employee of [employer] present with patient who is attempting to find patients POA contact information which was not provided with patient. 7:13 PM: Attending spoke with appointed guardian regarding plan for patient. She stated patient is a full code and requested for her to continue to receive all life-saving measures including surgery. Consultations:  7:13 PM: Attending spoke with Dr. Julien Yoo (Neurosurgery). Discussed case.   They will take the patient to OR.    --------------------------------------- ED Clinical Course/MDM --------------------------------------    Patient is a 59-year-old female with a history of

## 2018-03-07 NOTE — PAYOR INFORMATION
Patient Magali Alvarez:  [de-identified]  Primary AUTH/CERT:    [address] [facility] [address]  [address] Name:   [address] [place] [address],[address]  Primary Insurance Plan Name:  [address] [place] [address],[facility] Floor  Primary Insurance Group Number:    Primary Insurance Plan Type: [employer]  Primary Insurance Policy Number:  [id number]    Secondary AUTH/CERT:    [facility] Name:   [facility]  Secondary Insurance Plan Name:  [id number]  Secondary Insurance Group Number:    Secondary Insurance Plan Type: X  Secondary Insurance Policy Number:  [id number]

## 2018-03-07 NOTE — CARE COORDINATION
3/7/2018 SOCIAL WORK TRANSITION OF CARE/DISCHARGE PLANNING   FOLLOWED UP WITH PATIENT'S NURSE (DEE DEE)AT HER GROUP HOME ([employer] [facility], [id number]). WHEN PATIENT IS READY FOR DISCHARGE, PLEASE CONTACT NURSE AT ([phone]) TO ASSIST WITH TRANSPORT. WILL AWAIT FURTHER EVALS TO ASSIST WITH FURTHER DISCHARGE PLANNING.  SPOKE WITH A REPRESENTATIVE OF [employer] (Esther Pedersen). NIGEL LEON IS OVER THIS CASE ([phone];IN THE OFFICE FRI 9AM -1 PM). SOCIAL WORK TO FOLLOW AND ASSIST PRN.    Electronically signed by WINDY Orozco on 3/7/2018 at 4:19 PM

## 2018-03-07 NOTE — BRIEF OP NOTE
Brief Postoperative Note    Romulo Daniels  YOB: 1945  [id number]    Pre-operative Diagnosis: acute right frontoparietal SDH    Post-operative Diagnosis: Same    Procedure: Stat crani for above    Anesthesia: General    Surgeons/Assistants: Julien    Estimated Blood SQCW:873  Complications: None    Specimens: Was Obtained: Hematoma    Findings: acute SDH    Electronically signed by Leanna Galindo MD on 3/6/2018 at 10:17 PM

## 2018-03-07 NOTE — PROGRESS NOTES
electrolytes & replace as needed. Monitor I & O.     ID: Leukocytosis -Afebrile. Endocrine:  Hyperglycemia. HX of diabetes   Monitor BS.  ISS.     MSK: contracted. Deconditioned. Turn & reposition. Monitor for skin breakdown. Heme: Anemia. Monitor CBC. Bowel regime: . Colace. MOM  Pain control/Sedation:    DVT prophylaxis: SCDs. No Lovenox/heparin due to SDH  GI prophylaxis: Protonix TF. Glucose protocol:  ISS  Mouth/Eye care: Artificial tears. Peridex. Gil: Keep in place for critical care monitoring of fluid balance. Ancillary consults:   Neurosurgery. Medicine. Patient/Family update: Will update when available. Code status:  Full      Disposition:  NSICU.         PRAVIN Valdivia-CNP  3/7/2018  7:28 AM

## 2018-03-07 NOTE — ANESTHESIA PRE-OP
oxyCODONE (ROXICODONE) 5 MG immediate release tablet Take 5 mg by mouth every 4 hours as needed for Pain .  polyethylene glycol (GLYCOLAX) powder Take 17 g by mouth daily as needed      warfarin (COUMADIN) 3 MG tablet Take 3 mg by mouth daily      ergocalciferol (ERGOCALCIFEROL) 81122 UNITS capsule Take 1 capsule by mouth once a week 4 capsule 3    bismuth subsalicylate (PEPTO BISMOL) 262 MG/15ML suspension Take 30 mLs by mouth 2 times daily as needed for Indigestion       LORazepam (ATIVAN) 2 MG tablet Take 2 mg by mouth daily as needed for Anxiety      aspirin 81 MG chewable tablet Take 81 mg by mouth daily      omeprazole (PRILOSEC) 20 MG delayed release capsule Take 20 mg by mouth 2 times daily       Mastectomy Bra MISC by Does not apply route 1 each 0    amLODIPine (NORVASC) 2.5 MG tablet Take 2.5 mg by mouth daily       ferrous sulfate (FERROUSUL) 325 (65 FE) MG tablet Take 1 tablet by mouth 2 times daily (with meals) 5gr 1 tab daily 60 tablet 3    metFORMIN (GLUCOPHAGE) 500 MG tablet Take 500 mg by mouth daily      memantine (NAMENDA XR) 28 MG CP24 capsule Take 28 mg by mouth daily       valsartan-hydrochlorothiazide (DIOVAN-HCT) 320-25 MG per tablet Take 1 tablet by mouth daily      metoprolol (LOPRESSOR) 50 MG tablet Take 50 mg by mouth 2 times daily.  traZODone (DESYREL) 50 MG tablet Take 50 mg by mouth nightly.  acetaminophen (TYLENOL) 325 MG tablet Take 325 mg by mouth every 4 hours as needed for Pain or Fever       donepezil (ARICEPT ODT) 10 MG disintegrating tablet Take 10 mg by mouth nightly.  therapeutic multivitamin-minerals (THERAGRAN-M) tablet Take 1 tablet by mouth daily.          Current Facility-Administered Medications   Medication Dose Route Frequency Provider Last Rate Last Dose    sodium chloride flush 0.9 % injection 10 mL  10 mL Intravenous PRN Jose E Speak, DO        phytonadione (ADULT) (VITAMIN K) 10 mg in dextrose 5 % 100 mL IVPB  10 mg [facility]) tablet Take 1 tablet by mouth daily. Vital Signs (Current)   Vitals:    18   BP: (!) 169/100   Pulse: 73   Resp: 14   Temp:    SpO2: 97%     Vital Signs Statistics (for past 48 hrs)     Temp  Av.7 °F (35.9 °C)  Min: 96.7 °F (35.9 °C)   Min taken time: 18 1638  Max: 96.7 °F (35.9 °C)   Max taken time: 18 163  Pulse  Av.5  Min: 67   Min taken time: 18 1638  Max: 68   Max taken time: 18  Resp  Av  Min: 12   Min taken time: 18 163  Max: 15   Max taken time: 18  BP  Min: 169/100   Min taken time: 18  Max: 173/95   Max taken time: 18 163  SpO2  Av.5 %  Min: 97 %   Min taken time: 18  Max: 98 %   Max taken time: 18 1638    BP Readings from Last 3 Encounters:   18 (!) 169/100   17 (!) 159/88   17 (!) 157/87     BMI  There is no height or weight on file to calculate BMI. Estimated body mass index is 22.14 kg/m² as calculated from the following:    Height as of 17: 5' 3\" (1.6 m). Weight as of 17: 125 lb (56.7 kg).     CBC   Lab Results   Component Value Date    WBC 11.3 2018    RBC 3.72 2018    HGB 10.9 2018    HCT 32.7 2018    MCV 87.9 2018    RDW 14.5 2018     2018     CMP    Lab Results   Component Value Date     2018    K 3.3 2018     2018    CO2 27 2018    BUN 22 2018    CREATININE 0.9 2018    GFRAA >60 2018    LABGLOM >60 2018    GLUCOSE 198 2018    GLUCOSE 113 2012    PROT 7.3 2018    CALCIUM 9.5 2018    BILITOT 0.3 2018    ALKPHOS 84 2018    AST 19 2018    ALT 17 2018     BMP    Lab Results   Component Value Date     2018    K 3.3 2018     2018    CO2 27 2018    BUN 22 2018    CREATININE 0.9 2018    CALCIUM 9.5 2018    GFRAA >60 2018

## 2018-03-07 NOTE — ANESTHESIA POST-OP
Department of Anesthesiology  Post-Anesthesia Note    Name:  Reid Dunham                                         Age:  74 y.o.   MRN:  [id number]     Last Vitals:  /80   Pulse 90   Temp 97.8 °F (36.6 °C) (Temporal)   Resp 15   SpO2 93%   Patient Vitals for the past 4 hrs:   Temp Temp src Pulse Resp SpO2   03/07/18 0400 97.8 °F (36.6 °C) Temporal 90 15 93 %   03/07/18 0300 - - 88 - -       Level of Consciousness:  Awake    Respiratory:  Stable    Oxygen Saturation:  Stable    Cardiovascular:  Stable    Hydration:  Adequate    PONV:  Stable    Post-op Pain:  Adequate analgesia    Post-op Assessment:  No apparent anesthetic complications    Additional Follow-Up / Treatment / Comment:  None    Stephen Conner DO  March 7, 2018   6:10 AM

## 2018-03-08 NOTE — PROGRESS NOTES
subdural hematoma as described above. Residual collection as described above with improving midline shift to the left now measuring 10 mm. Continued compression right lateral ventricle and dilation left lateral ventricle from outflow obstruction. Ct Head Wo Contrast    Result Date: 3/6/2018  Patient MRN:  [id number] : 1945 Age: 74 years Gender: Female Order Date:  3/6/2018 6:19 PM EXAM: CT HEAD WO CONTRAST NUMBER OF IMAGES:  107 INDICATION:  altered mental status COMPARISON: Head CT dated 2016. TECHNIQUE:  Computed tomography of the head was performed without intravenous contrast. Images were constructed in the axial plane. Multiplanar reformation of the axial images was performed in coronal and sagittal planes. Low-dose CT acquisition technique included one of following options: (1) automated exposure control;  (2) adjustment of mA and/or kV according to patient's size; or (3) use of iterative reconstruction. FINDINGS: There is a very large acute subdural hematoma extending over the lateral convexities of the right frontal, temporal, and parietal lobes with extension along the anterior falx. There is associated significant mass effect on the right cerebral hemisphere with significant effacement of the right lateral and third ventricles. The left lateral ventricle is dilated and may be entrapped. There is up to 12 mm of leftward midline shift with trans-falcine herniation. Very large acute subdural hematoma over the right cerebral hemisphere and anterior falx cerebri. Significant associated mass effect. Effacement of the right lateral and third ventricles and possible entrapment of the left lateral ventricle. Leftward midline shift up to 12 mm with trans-falcine herniation. The findings were discussed with Dr. Anneliese Marin of the [facility] ED at approximately 1830 hours on 3/6/2018.     Xr Chest Portable    Result Date: 3/6/2018  Patient MRN:  [id number] : 1945 Age: 74 years Gender:

## 2018-03-08 NOTE — PROGRESS NOTES
Nutrition Assessment    Type and Reason for Visit: Initial, Consult    Nutrition Recommendations: Continue current TF order of Glucerna 1.2 @ 40ml/hr continuous. Would recommend the addition of 1 protein modular daily to need 100% of patient's calculated protein needs. Will continue to monitor. Malnutrition Assessment:  · Malnutrition Status: Meets the criteria for moderate malnutrition  · Context: Chronic illness  · Findings of the 6 clinical characteristics of malnutrition (Minimum of 2 out of 6 clinical characteristics is required to make the diagnosis of moderate or severe Protein Calorie Malnutrition based on AND/ASPEN Guidelines):  1. Energy Intake-Not available,      2. Weight Loss-Unable to assess,    3. Fat Loss-Moderate subcutaneous fat loss, Orbital  4. Muscle Loss-Moderate muscle mass loss, Temples (temporalis muscle), Clavicles (pectoralis and deltoids)  5. Fluid Accumulation-No significant fluid accumulation,    6.  Strength-Not measured    Nutrition Diagnosis:   · Problem:  Moderate malnutrition, in context of chronic illness  · Etiology: related to Cognitive or neurological impairment     Signs and symptoms:  as evidenced by Nutrition support - EN, NPO status due to medical condition, Moderate loss of subcutaneous fat, Moderate muscle loss    Nutrition Assessment:  · Nutrition-Focused Physical Findings: +I/Os, disoriented, nonverbal, vomitting PTA, contractures present, nondistended abd, absent bs, mild scleral edema, NGT present, hypokalemia on admission   · Wound Type: Surgical Wound  · Current Nutrition Therapies:  · Oral Diet Orders: NPO   · Oral Diet intake: NPO  · Tube Feeding (TF) Orders:   · Feeding Route: Nasogastric  · Formula: Diabetic  · Rate (ml/hr):40ml/hr     · Volume (ml/day): 960 ml TV   · Duration: Continuous 24hrs  · TF Residuals: None recorded  · Water Flushes: Per Nursing Protocol  · Current TF & Flush Orders Provides: 1152 kcals, 57 gm pro, 772 ml free water  · Anthropometric Measures:  · Ht: 5' 3\" (160 cm) (obtained per EMR on last admit 9/2017 )   · Current Body Wt: 131 lb (59.4 kg) (3/8 actual bedscale )  · Admission Body Wt: 131 lb (59.4 kg) (3/8 first actual taken )  · Usual Body Wt:  (no recent weights per EMR; pt was 130# in 2016 per EMR )  · % Weight Change: CYNTHIA d/t pt nonverbal, no recent weights per EMR, ,     · Ideal Body Wt: 115 lb (52.2 kg), % Ideal Body 114%  · BMI Classification:  (BMI 23 using height of 5'3\")  · Comparative Standards (Estimated Nutrition Needs):  · Estimated Daily Total Kcal: 4101-6700 (MSJ:1030 x 1.2 )  · Estimated Daily Protein (g): 75-85  · Estimated Daily Fluid (ml/day): or per critical care    Estimated Intake vs Estimated Needs: Intake Less Than Needs, Intake Meets Needs (intake less than calculate pro needs from TF )    Nutrition Risk Level: Moderate    Nutrition Interventions:   Continue NPO, Modify current Tube Feeding (add 1 protein modular daily to current order of Glucerna 1.2 @40ml/hr )  Continued Inpatient Monitoring, Coordination of Care, Education not appropriate at this time    Nutrition Evaluation:   · Evaluation: Goals set   · Goals: TF to goal with good tolerance    · Monitoring: NPO Status, TF Intake, TF Tolerance, Gastric Residuals, Skin Integrity, Wound Healing, Fluid Balance, Ascites/Edema, Mental Status/Confusion, Weight, Comparative Standards, Pertinent Labs, Chewing/Swallowing, Nausea or Vomiting    See Adult Nutrition Doc Flowsheet for more detail.      Electronically signed by Michelle Mckeon RD, LD on 3/8/18 at 12:57 PM    Contact Number: [phone]

## 2018-03-09 NOTE — PROGRESS NOTES
Neuro Science Intensive Care Unit  Critical Care Consult  Daily Progress Note 3/9/2018    Date of Admission: 3/6/18    SURGICAL/INTERVENTIONAL PROCEDURES:   3/6 Stat right fronto temporoparietal craniotomy for evac SDH  3/9  Intubation    OVERNIGHT EVENTS: Patient intubated this AM due to angio edema. Afebrile, vital signs stable    PHYSICAL EXAM:    BP (!) 151/70   Pulse 98   Temp 99.8 °F (37.7 °C) (Temporal)   Resp 16   Ht 5' 3\" (1.6 m) Comment: obtained per EMR on last admit 9/2017   Wt 131 lb (59.4 kg)   SpO2 100%   BMI 23.21 kg/m²     General appearance:  Comfortable. Pain Description: no outward signs of discomfort  GCS:    1 - Does not open eyes   5 - Pushes away noxious stimulus  1-T - Makes no noise    Pupil size:  Left 2 mm  Right 2 mm  Pupil reaction: No  Wiggles fingers: Left No Right No  Hand grasp:   Left absent     Right absent  Wiggles toes: Left No    Right No  Plantar flexion: Left absent    Right absent    CONSTITUTIONAL: no acute distress, lying in hospital bed, sedated   CARDIOVASCULAR: S1 S2, regular rate, regular rhythm, no murmur/gallop/rub. Monitor: Sinus  PULMONARY: no rhonchi/rales/wheezes, no use of accessory muscles, 400/12/60/5,   RENAL: acosta to gravity, clear yellow urine  ABDOMEN: soft, nontender, nondistended, nontympanic, normal bowel sounds   SKIN/EXTREMITIES: no rashes/ecchymosis, no edema/clubbing, warm/dry, good capillary refill     LINES: [facility] accessed 3/7    CONSULTS:   Medicine  Neurosurgery      ASSESSMENT/PLAN:       · Neuro:  Acute SDH s/p crani frontotemporoparietal SDH evac, Hx Dementia, Stroke, MR. Neurosurgery following. Monitor neuro status, Aricept, Keppra  · CV: No acute issues HX of HTN, hyperlipidemia, CAD, CHF. Monitor hemodynamics. BP goal < 140. PRN hydralzine & labetalol. Amlodipine, metoprolol    · Pulm: Acute respiratory failure s/p intubations secondary to angioedema. Monitor RR & SpO2. Daily CXR. Daily ABG.   Decadron  · GI: No

## 2018-03-09 NOTE — FLOWSHEET NOTE
#8 ET tube 23 at the lip in place. Vent settings AC 12  100% O2 PEEP 5.0. Portable chest xray ordered to verify placement and propofol gtt started. Pulse ox 100% at this time.

## 2018-03-09 NOTE — FLOWSHEET NOTE
Dr. Toni Hansen paged due to airway compromise. Patient right side of face, eyes, lips, and tongue extremely swollen. Paged anesthesiology at this time per Dr. Toni Hansen.

## 2018-03-09 NOTE — CARE COORDINATION
3/9/2018 SOCIAL WORK TRANSITION OF CARE/DISCHARGE PLANNING  SOCIAL WORK LEFT A SECOND MESSAGE FOR PATIENT'S GUARDIAN (Gordo Milligan WITH SVETLANA-NURSE AT [facility] ([phone]), AND LEFT MESSAGE FOR AHMET Ama Jose ([phone]). THIS  AWAITING RETURN CALL FOR FURTHER DISCHARGE PLANNING.   Electronically signed by WINDY Cha on 3/9/2018 at 3:20 PM

## 2018-03-10 NOTE — PROGRESS NOTES
emphysema. 1. Postoperative changes from evacuation right subdural hematoma as described above. Residual collection as described above with improving midline shift to the left now measuring 10 mm. Continued compression right lateral ventricle and dilation left lateral ventricle from outflow obstruction. Ct Head Wo Contrast    Result Date: 3/6/2018  Patient MRN:  [id number] : 1945 Age: 74 years Gender: Female Order Date:  3/6/2018 6:19 PM EXAM: CT HEAD WO CONTRAST NUMBER OF IMAGES:  107 INDICATION:  altered mental status COMPARISON: Head CT dated 2016. TECHNIQUE:  Computed tomography of the head was performed without intravenous contrast. Images were constructed in the axial plane. Multiplanar reformation of the axial images was performed in coronal and sagittal planes. Low-dose CT acquisition technique included one of following options: (1) automated exposure control;  (2) adjustment of mA and/or kV according to patient's size; or (3) use of iterative reconstruction. FINDINGS: There is a very large acute subdural hematoma extending over the lateral convexities of the right frontal, temporal, and parietal lobes with extension along the anterior falx. There is associated significant mass effect on the right cerebral hemisphere with significant effacement of the right lateral and third ventricles. The left lateral ventricle is dilated and may be entrapped. There is up to 12 mm of leftward midline shift with trans-falcine herniation. Very large acute subdural hematoma over the right cerebral hemisphere and anterior falx cerebri. Significant associated mass effect. Effacement of the right lateral and third ventricles and possible entrapment of the left lateral ventricle. Leftward midline shift up to 12 mm with trans-falcine herniation. The findings were discussed with Dr. Kelley Becerra of the [facility] ED at approximately 1830 hours on 3/6/2018.     Xr Chest Portable    Result Date: 320 mg Per NG tube Daily    And    hydrochlorothiazide  25 mg Oral Daily    pantoprazole  40 mg Intravenous Daily    And    sodium chloride (PF)  10 mL Intravenous Daily    dexamethasone  10 mg Intravenous Q6H    sodium chloride  250 mL Intravenous Once    sodium chloride flush  10 mL Intravenous 2 times per day    insulin lispro  0-18 Units Subcutaneous Q4H    magnesium oxide  400 mg Per NG tube Daily    docusate sodium  100 mg Per NG tube Daily    levetiracetam  500 mg Intravenous BID    [MAR Hold] sodium chloride  250 mL Intravenous Once     No consciousness, intubated,dressing clean and dry  Assessment:  Patient Active Problem List   Diagnosis    Alzheimer's dementia    Hypokalemia    Mental retardation    Ulcer of right heel (HCC)    Pulmonary embolism (HCC)    Acute deep vein thrombosis (DVT) of right lower extremity (HCC)    Acute cystitis without hematuria    HTN (hypertension), benign    [facility]IMBERLY [facility]) ([facility] [address]    Diabetes mellitus type 2, controlled ([facility] [address]    Subdural hematoma (HCC)     Plan:Continue current care  Stephon Veloz M.D.

## 2018-03-10 NOTE — PROGRESS NOTES
DAILY VENTILATOR WEANING ASSESSMENT PERFORMED    P/FIO2 Ratio = 383          (<100= do not Wean)                  Cs = 42                         (<32= Instability)  Plat. Pressure = 15  MV =  RSBI =    Instabilities:       Cardiovascular =       CNS = CNS 2       Respiratory =       Metabolic =    Parameters   NO  Wean per protocol  NO  Ask Physician for a weaning plan YESAdditional Comments:     Performed by Silva Smith RRT      Reference Table:    Cardiovascular     CNS      1. Mean BP less than or equal to 75   1. Neuromuscular blockade  2. Heart Rate greater than 130   2. RASS of -3, -4, -5  3. Myocardial Ischemia    3. RASS of +3, +4  4. Mechanical Assist Device    4. ICP greater than 15 or             Intracranial Hypertension         Respiratory      Metabolic  1. PEEP equal to or greater than 10cm/H20  1. Temp. (8hrs) less than 95 or > 103  2. Respiratory Rate greater than 35   2. WBC < 5000 or > 83352  3. Minute Volume greater than 15L  4. pH less than 7.30  5.  Deteriorating chest X-ray

## 2018-03-11 NOTE — PROGRESS NOTES
Neuro Science Intensive Care Unit  Critical Care Consult  Daily Progress Note 3/11/2018    Date of Admission: 3/6/18    SURGICAL/INTERVENTIONAL PROCEDURES:   3/6 Stat right fronto temporoparietal craniotomy for evac SDH  3/9  Intubation    OVERNIGHT EVENTS: Patient having LUE rhythmic movements associated with left sided facial tics this AM.  Afebrile, one dose of antihypertensives administer and 18 units of insuline given in the last 24 hours     PHYSICAL EXAM:    BP (!) 151/70   Pulse 79   Temp 99 °F (37.2 °C) (Temporal)   Resp 15   Ht 5' 3\" (1.6 m) Comment: obtained per EMR on last admit 9/2017   Wt 131 lb (59.4 kg)   SpO2 100%   BMI 23.21 kg/m²     General appearance:  Comfortable. Pain Description: no outward signs of discomfort  GCS:    1 - Does not open eyes   5 - Pushes away noxious stimulus  1-T - Makes no noise    Pupil size:  Left 2 mm  Right 2 mm  Pupil reaction: Yes  Wiggles fingers: Left No Right No  Hand grasp:   Left absent     Right absent  Wiggles toes: Left No    Right No  Plantar flexion: Left absent    Right absent    CONSTITUTIONAL: no acute distress, lying in hospital bed, no sedation  CARDIOVASCULAR: S1 S2, regular rate, regular rhythm, no murmur/gallop/rub. Monitor: Sinus  PULMONARY: no rhonchi/rales/wheezes, no use of accessory muscles, PS 10/5,   RENAL: acosta to gravity, clear yellow urine  ABDOMEN: soft, nontender, nondistended, nontympanic, normal bowel sounds   SKIN/EXTREMITIES: no rashes/ecchymosis, no edema/clubbing, warm/dry, good capillary refill     LINES: [facility] accessed 3/7    CONSULTS:   Medicine  Neurosurgery      ASSESSMENT/PLAN:       · Neuro: Possible seizure, acute SDH s/p crani frontotemporoparietal SDH evac, Hx Dementia, Stroke, MR. Neurosurgery following. Monitor neuro status, Aricept, increase Keppra, EEG  · CV: No acute issues HX of HTN, hyperlipidemia, CAD, CHF. Monitor hemodynamics. BP goal < 140. PRN hydralazine & labetalol.   Amlodipine, metoprolol    · Pulm: Acute respiratory failure s/p intubations secondary to angioedema - improving. Monitor RR & SpO2. Daily CXR. Daily ABG. Wean Decadron  · GI: No acute issues. Gastric tube. NPO. Tube feeds. Monitor bowel function. · Renal: No acute issues. Monitor BUN & Cr. Monitor electrolytes & replace as needed. Monitor urine output. · ID: Leukocytosis - reactive. Monitor for SIRS. · Endocrine: Hyperglycemia. HX of diabetes. Monitor BS. ISS. · MSK: No acute issues. ROM. turn & reposition. Routine skin care to prevent breakdown  · Heme: No acute issues. Monitor CBC. Bowel regime: Colace, MOM  Pain control/Sedation: Fentanyl,  Tylenol, Oxycodone   DVT prophylaxis: SCDs. Heparin  GI prophylaxis: Protonix,  TF  Glucose protocol:  ISS  Mouth/Eye care: artificial tears. Peridex  Gil: Keep in place for critical care monitoring of fluid balance. Family update:  Will update guardian when available   Code status:  Full  Disposition:  NSICU      Electronically signed by Avelina Schofield NP on 3/11/2018 at 8:40 AM

## 2018-03-11 NOTE — PROGRESS NOTES
This note also relates to the following rows which could not be included:  Vt Ordered - Cannot attach notes to unvalidated device data  Rate Set - Cannot attach notes to unvalidated device data  Peak Flow - Cannot attach notes to unvalidated device data  Pressure Support - Cannot attach notes to unvalidated device data  FiO2  - Cannot attach notes to unvalidated device data  Sensitivity - Cannot attach notes to unvalidated device data  PEEP/CPAP - Cannot attach notes to unvalidated device data  I Time/ I Time % - Cannot attach notes to unvalidated device data  Vt Exhaled - Cannot attach notes to unvalidated device data  Rate Measured - Cannot attach notes to unvalidated device data  Minute Volume - Cannot attach notes to unvalidated device data  Peak Inspiratory Pressure - Cannot attach notes to unvalidated device data  I:E Ratio - Cannot attach notes to unvalidated device data  High Peep/I Pressure - Cannot attach notes to unvalidated device data  Mean Airway Pressure - Cannot attach notes to unvalidated device data  Pulse - Cannot attach notes to unvalidated device data  SpO2 - Cannot attach notes to unvalidated device data  High Pressure Alarm - Cannot attach notes to unvalidated device data    DAILY VENTILATOR WEANING ASSESSMENT PERFORMED    P/FIO2 Ratio = 377         (<100= do not Wean)                  Cs = 51                         (<32= Instability)  Plat. Pressure = 13  MV = 4.3  RSBI = 51    Instabilities:       Cardiovascular =       CNS = CNS 2       Respiratory =       Metabolic =    Parameters   NO  Wean per protocol  NO  Ask Physician for a weaning plan YES  Additional Comments:     Performed by Young Whitaker RRT      Reference Table:    Cardiovascular     CNS      1. Mean BP less than or equal to 75   1. Neuromuscular blockade  2. Heart Rate greater than 130   2. RASS of -3, -4, -5  3. Myocardial Ischemia    3. RASS of +3, +4  4. Mechanical Assist Device    4.  ICP greater than 15 or Intracranial Hypertension         Respiratory      Metabolic  1. PEEP equal to or greater than 10cm/H20  1. Temp. (8hrs) less than 95 or > 103  2. Respiratory Rate greater than 35   2. WBC < 5000 or > 76960  3. Minute Volume greater than 15L  4. pH less than 7.30  5.  Deteriorating chest X-ray

## 2018-03-12 NOTE — CONSULTS
PHYSICAL EXAMINATION:  GENERAL:  Reveals a 75-year-old woman who is 5 feet 3 inches tall, weighs  131 pounds. VITAL SIGNS:  Blood pressure 140/60, pulse 73, respirations 13, temperature  99.4. NEUROLOGIC:  Found her to be unresponsive, only slightly arouses to painful  stimuli. Cranial nerves revealed pupils 5 mm, very sluggish reaction to light. Bilateral positive corneal reflexes. Masseter muscles symmetrical.  Has  positive arcus cephalic reflex. No meningeal signs. Does briefly gag the  movement of the endotracheal tube. Has a positive palmomental reflux  compatible with vascular dementia. Motor exam shows  no voluntary movement of her upper arms or legs at this time. Sensory  evaluation, she does not withdraw to nail bed pressure currently and no  evidence of rest tremor or rigidity. Deep tendon reflexes are  symmetrically hyporeflexic. This woman does have a foundation of vascular cognitive impairment or  vascular dementia, has only had been treated for Alzheimer's disease. She  had a subdural hematoma requiring surgical evacuation. Her prognosis is  guarded. I will do an EEG tomorrow to make sure no seizure activity is  complicating this situation. The large subdural hematoma of the midline  shift would have been very damaging to the brain. Thanks for the honor of this consultation.         Susana Romero MD    D: 03/11/2018 22:49:46       T: 03/12/2018 0:01:30     [email or web address]_[email or web address]  Job#: [id number]     Doc#: [id number]    CC:

## 2018-03-12 NOTE — PATIENT CARE CONFERENCE
P Quality Flow/Interdisciplinary Rounds Progress Note        Quality Flow Rounds held on March 12, 2018    Disciplines Attending:  Bedside Nurse,  and     Nikita Graham was admitted on 3/6/2018  8:42 PM    Anticipated Discharge Date:  Expected Discharge Date: 03/14/18    Disposition:    Rah Score:  Rah Scale Score: 13    Readmission Risk              Readmission Risk:        22.5       Age 72 or Greater:  1    Admitted from SNF or Requires Paid or Family Care:  0    Currently has CHF,COPD,ARF,CRI,or is on dialysis:  0    Takes more than 5 Prescription Medications:  4    Takes Digoxin,Insulin,Anticoagulants,Narcotics or ASA/Plavix:  [address] in Past 12 Months:  10    On Disability:  3    Patient Considers own Health:  2.5          Discussed patient goal for the day, patient clinical progression, and barriers to discharge. The following Goal(s) of the Day/Commitment(s) have been identified:  EEG today at 1000. Seizure precautions, Vent management/weaning parameters, Blood pressure management.       Joyce Jo  March 12, 2018

## 2018-03-12 NOTE — PROGRESS NOTES
[facility] Intensive Care Unit  Critical Care  Daily Progress Note 3/12/2018    Date of Admission: 03/08/2018    CC: Follow up for subdural hematoma. Patient Active Problem List   Diagnosis    Alzheimer's dementia    Hypokalemia    Mental retardation    Ulcer of right heel ([address]    Pulmonary embolism (HCC)    Acute deep vein thrombosis (DVT) of right lower extremity (HCC)    Acute cystitis without hematuria    HTN (hypertension), benign    KIMBERLY (acute kidney injury) ([address]    Diabetes mellitus type 2, controlled ([address]    Subdural hematoma ([address]     SURGICAL/INTERVENTIONAL PROCEDURES:   03/06/2018 Stat right fronto temporoparietal craniotomy for evac SDH  03/09/2018  Intubation    OVERNIGHT EVENTS: T max  98. 9 F. She did not require any antihypertensive agents in the previous 24 hours. She developed seizures of her left side of her face going down her left arm. She was initiated on Ativan. Her Keppra was increased. She did require 18 units of insulin in the previous 24 hours. PHYSICAL EXAM:    BP (!) 183/85   Pulse 60   Temp 99.2 °F (37.3 °C)   Resp 18   Ht 5' 3\" (1.6 m) Comment: obtained per EMR on last admit 9/2017   Wt 131 lb (59.4 kg)   SpO2 100%   BMI 23.21 kg/m²     Intake/Output Summary (Last 24 hours) at 03/12/18 1059  Last data filed at 03/12/18 1000   Gross per 24 hour   Intake             1305 ml   Output             1498 ml   Net             -193 ml      General appearance:  Comfortable. NEUROLOGIC:   RASS Score:  -3.    Overall CAM ICU  0     GCS:    1 - Does not open eyes   4 - Moves part of body but does not remove noxious stimulus  1 - Makes no noise       Pupil size:  Left 3 mm  Right 4 mm  Pupil reaction: Yes   PERRLA  Wiggles fingers: Left No Right No  Hand grasp:   Left none     Right none  Wiggles toes: Left no  Right non  Plantar flexion: Left none    Right none  Crani incision:  CDI  CONSTITUTIONAL: No acute distress, lying in hospital bed.     CARDIOVASCULAR: S1 S2, regular rate, regular rhythm, no murmur/gallop/rub. Monitor: NSR  PULMONARY: Bilaterally clear. No rhonchi/rales/wheezes, no use of accessory muscles. Intubated. Mechanical ventilation. Vt 500 ml. FiO2 50%. Mode: AC rate 12. Peep 5 cm. PF ratio 436. Suctioned for clear secretions. RENAL:  Gil to gravity, clear yellow urine. Fluid balance for previous 24 hours:  - 193 ml. ABDOMEN: Soft, nontender, nondistended, nontympanic, normal bowel sounds. NGT. Tube feeding: Diabetic formula at 40 ml per hour. MUSCULOSKELETAL:  Withdraws all extremities to pain. Intermittent seizures involving the left side of face and going down left arm. SKIN/EXTREMITIES: No rashes/ecchymosis, no edema/clubbing, warm/dry, good capillary refill. Peripheral IVs.  Right upper chest: Implantable port accessed. Left radial arterial line in place. Day 7. Recent Labs      03/10/18   0515  03/11/18   0410  03/12/18   0430   WBC  12.8*  15.6*  14.1*   HGB  7.8*  7.9*  7.6*   HCT  24.0*  24.8*  24.2*   MCV  87.3  88.3  88.3   PLT  200  233  234       Recent Labs      03/10/18   0515  03/11/18   0410  03/12/18   0430   NA  141  140  141   K  4.5  4.2  4.3   CO2  24  26  26   PHOS  2.5  2.9  2.8   BUN  31*  41*  47*   CREATININE  0.9  0.9  0.9     ASSESSMENT/PLAN:     Patient Active Problem List   Diagnosis    Alzheimer's dementia    Hypokalemia    Mental retardation    Ulcer of right heel (HCC)    Pulmonary embolism (HCC)    Acute deep vein thrombosis (DVT) of right lower extremity (HCC)    Acute cystitis without hematuria    HTN (hypertension), benign    KIMBERLY (acute kidney injury) ([address]    Diabetes mellitus type 2, controlled (HCC)    Subdural hematoma (HCC)     Neuro:  Seizures. Acute subdural hematoma. S/P craniotomy for evacuation of right frontal temporal parietal subdural hematoma. Hx of dementia, stroke, cataracts and mental retardation - Monitor neuro status. Neurosurgery following.  Neurology following. Keppra for seizure prophylaxis. Ativan PRN for seizures. Aricept. CV: No acute issues. HX of HTN  - Monitor hemodynamics. BP goal systolic less than 820 mm Hg. PRN hydralzine & labetalol. Norvasc. Hydrochlorothiazide. Metoprolol. Valsartan. Pulm: Acute respiratory failure due to the development of angioedema - Intubated. Mechanical ventilation. Vt 500 ml. FiO2 50%. Mode: AC rate 12. Peep 5 cm. Monitor RR & SpO2. Daily CXR & ABG. Pulmonary hygiene. Wean Decadron. GI: N dysphagia. BMI 23. Hx of GERD & diverticulosis - monitor bowel function. NPO>  NG tube. Tube feeding: Diabetic at 40 ml per hour. Pepto-Bismol. Zofran. Bowel regime. Renal:  No acute issues - Monitor BUN & Cr. Monitor electrolytes & replace as needed. Monitor I & O.   Magnesium oxide. Gil catheter. ID: Leukocytosis. Hx of shingled - Monitor for sepsis. Endocrine:  Hyperglycemia. HX of diabetes  - Monitor BS.  ISS.     MSK: Deconditioned - ROM. Turn & reposition. PT & OT when able. Monitor for skin breakdown. Heme: Acute blood loss anemia. Hx of breast cancer - Monitor CBC. Implantable port. Bowel regime: Colace. Ducolax suppository. Pain control/Sedation:  Fentanyl. Rogene Dubin.   Tylenol. Ativan. DVT prophylaxis: SCDs. No Lovenox/heparin due to craniotomy    GI prophylaxis: Protonix. TF.   Glucose protocol:  ISS  Mouth/Eye care: Artificial tears. Peridex. Gil: Keep in place for critical care monitoring of fluid balance. Ancillary consults:  Neurology. Neurosurgery. Medicine. Patient/Family update: Will update when guardian available. Code status:  Full    Disposition:  NSICU.       Electronically signed by Diony Vargas RN MSN APRN-CCNS CCRN 3/12/2018 11:40 AM

## 2018-03-12 NOTE — CARE COORDINATION
3/12/2018 SOCIAL WORK TRANSITION OF CARE/DISCHARGE PLANNING  SW SPOKE WITH PATIENT'S GUARDIAN (MS. Yeung Beams DISCHARGE PLANNING. IF PATIENT NEEDS LTACH SHE WOULD LIKE FOR PATIENT TO GO TO [facility]-REFERRAL MADE. SW REQUESTED SKILLED NURSING FACILITY CHOICES IF NEEDED AT DISCHARGE. [address] WILL REVIEW LIST AND CONTACT  WITH CHOICES. GURU WILL FOLLOW AND ASSIST PRN.   Electronically signed by WINDY Aguirre on 3/12/2018 at 2:12 PM

## 2018-03-13 NOTE — PROGRESS NOTES
[facility] Science Intensive Care Unit  Critical Care  Daily Progress Note 3/13/2018    Date of Admission: 03/08/2018    CC: Follow up for subdural hematoma. Patient Active Problem List   Diagnosis    Alzheimer's dementia    Hypokalemia    Mental retardation    Ulcer of right heel ([address]    Pulmonary embolism (HCC)    Acute deep vein thrombosis (DVT) of right lower extremity (HCC)    Acute cystitis without hematuria    HTN (hypertension), benign    KIMBERLY (acute kidney injury) ([address]    [facility] mellitus type 2, controlled [facility]    Subdural hematoma ([facility]     SURGICAL/INTERVENTIONAL PROCEDURES:   03/06/2018 Stat right fronto temporoparietal craniotomy for evac SDH  03/09/2018  Intubation    OVERNIGHT EVENTS: T max  99.0 F. She did not require any antihypertensive agents in the previous 24 hours. She did not require any Ativan in the previous 24 hours. She did require 18 units of insulin in the previous 24 hours. PHYSICAL EXAM:    BP (!) 159/83   Pulse 82   Temp 99.3 °F (37.4 °C) (Temporal)   Resp 17   Ht 5' 3\" (1.6 m) Comment: obtained per EMR on last admit 9/2017   Wt 131 lb (59.4 kg)   SpO2 100%   BMI 23.21 kg/m²     Intake/Output Summary (Last 24 hours) at 03/13/18 1120  Last data filed at 03/13/18 1000   Gross per 24 hour   Intake             1466 ml   Output             1545 ml   Net              -79 ml      General appearance:  Comfortable. NEUROLOGIC:   RASS Score:  -3.    Overall CAM ICU  0     GCS:    1 - Does not open eyes   4 - Moves part of body but does not remove noxious stimulus  1 - Makes no noise       Pupil size:  Left 3 mm  Right 4 mm  Pupil reaction: Yes   PERRLA  Wiggles fingers: Left No Right No  Hand grasp:   Left none     Right none  Wiggles toes: Left no  Right non  Plantar flexion: Left none    Right none  Crani incision:  CDI  CONSTITUTIONAL: No acute distress, lying in hospital bed. CARDIOVASCULAR: S1 S2, regular rate, regular rhythm, no murmur/gallop/rub. Monitor: NSR  PULMONARY: Bilaterally clear. No rhonchi/rales/wheezes, no use of accessory muscles. Intubated. Mechanical ventilation. Pressure support 10. FiO2 40%. Peep 5cm. PF ratio 368. Suctioned for clear secretions. RENAL:  Gil to gravity, clear yellow urine. Fluid balance for previous 24 hours:  - 79 ml. ABDOMEN: Soft, nontender, nondistended, nontympanic, normal bowel sounds. NGT. Tube feeding: Diabetic formula at 40 ml per hour. Last BM 03/12. MUSCULOSKELETAL:  Withdraws all extremities to pain. Intermittent seizures involving the left side of face and going down left arm. SKIN/EXTREMITIES: No rashes/ecchymosis, no edema/clubbing, warm/dry, good capillary refill. Peripheral IVs.  Right upper chest: Implantable port accessed. Left radial arterial line in place. Day 7. Recent Labs      03/11/18 0410 03/12/18   0430  03/13/18   0440   WBC  15.6*  14.1*  14.2*   HGB  7.9*  7.6*  7.8*   HCT  24.8*  24.2*  24.2*   MCV  88.3  88.3  88.0   PLT  233  234  254       Recent Labs      03/11/18 0410 03/12/18   0430  03/13/18   0440   NA  140  141  140   K  4.2  4.3  4.0   CO2  26  26  25   PHOS  2.9  2.8  2.7   BUN  41*  47*  45*   CREATININE  0.9  0.9  0.9     ASSESSMENT/PLAN:     Patient Active Problem List   Diagnosis    Alzheimer's dementia    Hypokalemia    Mental retardation    Ulcer of right heel (HCC)    Pulmonary embolism (HCC)    Acute deep vein thrombosis (DVT) of right lower extremity (HCC)    Acute cystitis without hematuria    HTN (hypertension), benign    [facility]BERLY (acute [facility]) ([facility]    Diabetes mellitus type 2, controlled (HCC)    Subdural hematoma (HCC)     Neuro:  Seizures. Acute subdural hematoma. S/P craniotomy for evacuation of right frontal temporal parietal subdural hematoma. Hx of dementia, stroke, cataracts and mental retardation - Monitor neuro status. Neurosurgery following. Neurology following. Keppra for seizure prophylaxis.   Ativan PRN for seizures. Aricept. CV: No acute issues. HX of HTN  - Monitor hemodynamics. BP goal systolic less than 618 mm Hg. PRN hydralzine & labetalol. Norvasc. Hydrochlorothiazide. Metoprolol. Valsartan. Pulm: Acute respiratory failure due to the development of angioedema - Intubated. Mechanical ventilation. Vt 500 ml. FiO2 50%. Mode: AC rate 12. Peep 5 cm. Monitor RR & SpO2. Daily CXR & ABG. Pulmonary hygiene. Will need trach. GI: N dysphagia. BMI 23. Hx of GERD & diverticulosis - monitor bowel function. NPO>  NG tube. Tube feeding: Diabetic at 40 ml per hour. Pepto-Bismol. Zofran. Bowel regime. Will need PEG tube. Renal:  No acute issues - Monitor BUN & Cr. Monitor electrolytes & replace as needed. Monitor I & O.   Magnesium oxide. Gil catheter. ID: Leukocytosis. Hx of shingled - Monitor for sepsis. Endocrine:  Hyperglycemia. HX of diabetes  - Monitor BS. Will start lantus. ISS.     MSK: Deconditioned - ROM. Turn & reposition. PT & OT when able. Monitor for skin breakdown. Heme: Acute blood loss anemia. Hx of breast cancer - Monitor CBC. Implantable port. Bowel regime: Colace. Ducolax suppository. Pain control/Sedation:  Fentanyl. Norco.   Tylenol. Ativan. DVT prophylaxis: SCDs. No Lovenox/heparin due to craniotomy    GI prophylaxis: Protonix. TF.   Glucose protocol:  ISS  Mouth/Eye care: Artificial tears. Peridex. Gil: Keep in place for critical care monitoring of fluid balance. Ancillary consults:  Neurology. Neurosurgery. Medicine. Patient/Family update: Will update when guardian available. Code status:  Full    Disposition:  NSICU.       Electronically signed by Erica Aquino RN MSN APRN-CCNS CCRN 3/13/2018 11:48 AM

## 2018-03-13 NOTE — PROGRESS NOTES
Neurosurg progress note  VITALS:  BP (!) 183/85   Pulse 84   Temp 98.7 °F (37.1 °C)   Resp 16   Ht 5' 3\" (1.6 m) Comment: obtained per EMR on last admit 2017   Wt 131 lb (59.4 kg)   SpO2 100%   BMI 23.21 kg/m²   8HR INTAKE OUTPUT:  No intake/output data recorded. Ct Head Wo Contrast    Result Date: 3/7/2018  Patient MRN:  [id number] : 1945 Age: 74 years Gender: Female Order Date:  3/7/2018 5:23 AM EXAM: CT HEAD WO CONTRAST NUMBER OF IMAGES:  127 INDICATION:  post op  COMPARISON: 3/6/2018 TECHNIQUE: Routine axial images through the head without contrast. Coronal and sagittal reformations. Low-dose CT  acquisition technique included one of following options: 1 . Automated exposure control 2. Adjustment of MA and or KV according to patient's size or 3. Use of iterative reconstruction. FINDINGS:  Interval wide right craniotomy for evacuation right subdural hematoma. Residual right subdural collection which is a mixture of air, blood and fluid has a maximal thickness of 19 mm on slice 31 of series 4 compared to prior measurement of 26 mm. Small amount of subdural blood along the right falx is similar. Associated local mass effect with compression right lateral ventricle and dilation left lateral ventricle from outflow obstruction. Midline shift to the left is 10 mm compared to prior measurement of 14 mm. Slight shift of the uncus from right to left. Improving. Cisternal spaces are otherwise prominent from volume loss. Mild periventricular and subcortical white matter hypodensities. No dominant mass. Mild mucosal thickening ethmoid air cells. Old left medial orbital wall fracture. Otherwise, Imaged paranasal sinuses and mastoid air cells are clear. Intraorbital contents are unremarkable. Right scalp soft tissue swelling/hematoma and emphysema. 1. Postoperative changes from evacuation right subdural hematoma as described above.  Residual collection as described above with improving midline shift to the left now measuring 10 mm. Continued compression right lateral ventricle and dilation left lateral ventricle from outflow obstruction. Ct Head Wo Contrast    Result Date: 3/6/2018  Patient MRN:  [id number] : 1945 Age: 74 years Gender: Female Order Date:  3/6/2018 6:19 PM EXAM: CT HEAD WO CONTRAST NUMBER OF IMAGES:  107 INDICATION:  altered mental status COMPARISON: Head CT dated 2016. TECHNIQUE:  Computed tomography of the head was performed without intravenous contrast. Images were constructed in the axial plane. Multiplanar reformation of the axial images was performed in coronal and sagittal planes. Low-dose CT acquisition technique included one of following options: (1) automated exposure control;  (2) adjustment of mA and/or kV according to patient's size; or (3) use of iterative reconstruction. FINDINGS: There is a very large acute subdural hematoma extending over the lateral convexities of the right frontal, temporal, and parietal lobes with extension along the anterior falx. There is associated significant mass effect on the right cerebral hemisphere with significant effacement of the right lateral and third ventricles. The left lateral ventricle is dilated and may be entrapped. There is up to 12 mm of leftward midline shift with trans-falcine herniation. Very large acute subdural hematoma over the right cerebral hemisphere and anterior falx cerebri. Significant associated mass effect. Effacement of the right lateral and third ventricles and possible entrapment of the left lateral ventricle. Leftward midline shift up to 12 mm with trans-falcine herniation. The findings were discussed with Dr. Digna Robert of the [facility] ED at approximately 1830 hours on 3/6/2018.     Xr Chest Portable    Result Date: 3/6/2018  Patient MRN:  [id number] : 1945 Age: 74 years Gender: Female Order Date:  3/6/2018 5:37 PM EXAM: XR CHEST PORTABLE NUMBER OF IMAGES:  1, VIEWS:  1 INDICATION: Emesis,

## 2018-03-13 NOTE — PROGRESS NOTES
deviation. Minimal Stiffness of Digits, able to extend joints of digits to ~ 160*. [place] Deformity of 4th digit  No Involuntary movement of extremity noted Absent    Absent FMC/dexterity noted during ADL tasks     Sensation:  No Response to Pain stim of nailbeds, unable to assess general sensation at this time  Tone: Low tone Bilateral UEs, start of stiffness/possible contractures of L Wrist/Digits  Edema: Moderate edema posterior aspect of Anton Hands - elevated UEs for edema control     Functional Assessment:   Initial Status  Comments   Feeding  NPO    Grooming  DEP    Upper Body Dressing DEP     Lower Body Dressing DEP    Bathing NT    Toileting  DEP    Bed Mobility  DEP Rolling/Repositioning   Functional Transfers NT     Functional Mobility NT                             Treatment/Comments: At end of session, pt was properly positioned in semi-supine with all devices within reach, all lines and tubes intact   UEs elevated for Edema control. Rolled Cloth placed in Left Palm to open web-space and facilitate Digit Extension. Wrist placed in Neutral - position was maintained w/o the need for intervention/additional positioning, etc.  All needs met. Spoke with RN at b/s about ROM, Proper Positioning of Hand and Wrist to facilitate Joint integrity and Neutral Position. Wrist/hand Splint Not warranted at this time, however, Nsg and OT will monitor for future needs. Spoke w/ CNP regarding this matter - Thank you for this Referral.       Education:  Provided Pt/Nsg ed re: Benefits/Purpose of OT services;  OT Plan of Care;  Risks and Benefits of use of Wrist/hand Splint at this time; Benefits of Cont'd Participation in OT services at D/C    Pt unable to verbalize/demonstrate a good understanding of education provided. Will Review PRN. Provided Skilled SUP/Assist w/ Pt safety, Proper Positioning, ADLs, Transfers and Functional Mobility as noted above, as well as set up and clean up for session. Skilled monitoring of Vitals and pts response to treatment. Consulted Nsg/CNP    Assessment of current Performance Deficits   Functional mobility [x]  ADLs [x] Strength [x]  Cognition [x]  Functional transfers  [x] IADLs [x] Safety Awareness [x]  Endurance [x]  Fine Motor Coordination [x] Balance [x] Vision/perception [x] Sensation [x]   Gross Motor Coordination [x] ROM [x]       Eval Complexity: Moderate  Profile and History- Moderate  Assessment of Occupational Performance and Identification of Deficits - High with 14 Performance Deficits identified  Clinical Decision Making- Moderate    Plan of Care:  ADL retraining X   Equipment needs: DME/Adaptive Equipment X   Neuromuscular re-education X Energy Conservation Techniques X  Functional Transfer training X  Patient and/or Family Education X  Functional Mobility training X  Environmental Modifications X  Cognitive re-training X   Compensatory techniques for ADLs X  Therapeutic Exercise X  Positioning to improve overall function X  Therapeutic Activity X   Other: X    Rehab Potential: Limited    Recommended Treatment Frequency: 1 day/week    Patient / Family Goal: Not able to state at this time    Pt/Family participated in the establishment of the following goals:      Short term goals  Time Frame: 1 week    GOAL (1)  Pt will achieve Neutral Position of Left Wrist within 20* for completion of Functional Ax  GOAL (2)  Pt will achieve > 170* Extension - AA/PROM of Joints of L Digits for functional use of L UE    Patient and/or family understands diagnosis, prognosis and plan of care: yes    Yes []  No [x]  Malnutrition indicators have been identified and nursing has been notified to ensure a dietitian consult is ordered. Time in: 1350  Time out: [address] [facility] Completed:   Moderate - 30 Minutes  Timed Treatment:  8 Minutes        Salazar Holman, OTR/L  # [id number]

## 2018-03-14 NOTE — CONSULTS
Medical Consult      CHIEF COMPLAINT:  AMS      HISTORY OF PRESENT ILLNESS:      The patient is a 74 y.o. female patient of Dr Kira Silva who presents with AMS. She has a history of mental retardation and resides in a local group home. Over the last several days prior to admission she was noted to be more confused. She was in the hospital for evaluation. she was found to have a large right-sided subdural hematoma. She was taken for emergency craniotomy and admitted to intensive care postoperatively. postoperatively she has had seizures requiring Ativan and remains intubated. in addition to her mental retardation she carries a diagnosis of dementia. She also has a previous history of breast cancer. she has had ongoing issues with blood pressure control. she remains minimally responsive. internal medicine was consulted for general medical management    Past Medical History:    Past Medical History:   Diagnosis Date    Breast CA ([facility] [address]     Right     Cataracts, bilateral     Dementia     Dementia in Alzheimer's disease     Vascular dementia    Diabetes mellitus ([facility] [address]     Diverticulosis     GERD (gastroesophageal reflux disease)     History of cardiovascular stress test 12-    LEXISCAN    Hypertension     Mental retardation     Mental retardation, moderate (I.Q. 35-49)     Osteopenia     Osteoporosis     Shingles     Unspecified cerebral artery occlusion with cerebral infarction 2008       Past Surgical History:    Past Surgical History:   Procedure Laterality Date    COLONOSCOPY  02/16/2016    CRANIOTOMY Right 03/06/2018    Dr. Pamela Knox, RADICAL  right breast mastectomy    history of breast CA    OTHER SURGICAL HISTORY  11/21/2016    Procedure: Dx lap, extensive DMITRI, small bowel resection, removal small bowel foreign body       Medications Prior to Admission:    Prescriptions Prior to Admission: bisacodyl (DULCOLAX) 10 MG suppository, Place 10 mg rectally daily as needed for Constipation  oxyCODONE (ROXICODONE) 5 MG immediate release tablet, Take 5 mg by mouth every 4 hours as needed for Pain .  polyethylene glycol (GLYCOLAX) powder, Take 17 g by mouth daily as needed  warfarin (COUMADIN) 3 MG tablet, Take 3 mg by mouth daily  ergocalciferol (ERGOCALCIFEROL) 35846 UNITS capsule, Take 1 capsule by mouth once a week  bismuth subsalicylate (PEPTO BISMOL) 262 MG/15ML suspension, Take 30 mLs by mouth 2 times daily as needed for Indigestion   LORazepam (ATIVAN) 2 MG tablet, Take 2 mg by mouth daily as needed for Anxiety  aspirin 81 MG chewable tablet, Take 81 mg by mouth daily  omeprazole (PRILOSEC) 20 MG delayed release capsule, Take 20 mg by mouth 2 times daily   Mastectomy Bra MISC, by Does not apply route  amLODIPine (NORVASC) 2.5 MG tablet, Take 2.5 mg by mouth daily   ferrous sulfate (FERROUSUL) 325 (65 FE) MG tablet, Take 1 tablet by mouth 2 times daily (with meals) 5gr 1 tab daily  metFORMIN (GLUCOPHAGE) 500 MG tablet, Take 500 mg by mouth daily  memantine (NAMENDA XR) 28 MG CP24 capsule, Take 28 mg by mouth daily   valsartan-hydrochlorothiazide (DIOVAN-HCT) 320-25 MG per tablet, Take 1 tablet by mouth daily  metoprolol (LOPRESSOR) 50 MG tablet, Take 50 mg by mouth 2 times daily. traZODone (DESYREL) 50 MG tablet, Take 50 mg by mouth nightly. acetaminophen (TYLENOL) 325 MG tablet, Take 325 mg by mouth every 4 hours as needed for Pain or Fever   donepezil (ARICEPT ODT) 10 MG disintegrating tablet, Take 10 mg by mouth nightly. therapeutic multivitamin-minerals (THERAGRAN-M) tablet, Take 1 tablet by mouth daily. Allergies:    Patient has no known allergies. Social History:    reports that she has never smoked. She does not have any smokeless tobacco history on file. She reports that she does not drink alcohol or use drugs. Family History:   family history is not on file.     REVIEW OF SYSTEMS    Review of systems not obtained due to patient factors. PHYSICAL EXAM:    Vitals:  BP (!) 145/75   Pulse 92   Temp 99.9 °F (37.7 °C) (Temporal)   Resp 20   Ht 5' 3\" (1.6 m) Comment: obtained per EMR on last admit 9/2017   Wt 131 lb (59.4 kg)   SpO2 100%   BMI 23.21 kg/m²     General appearance: comatose  Head: Normocephalic, without obvious abnormality, atraumatic  Eyes: conjunctivae/corneas clear. PERRL, EOM's intact. Fundi benign. Ears: normal TM's and external ear canals both ears  Nose: Nares normal. Septum midline. Mucosa normal. No drainage or sinus tenderness.   Throat: lips, mucosa, and tongue normal; teeth and gums normal  Neck: no adenopathy, no carotid bruit, no JVD, supple, symmetrical, trachea midline and thyroid not enlarged, symmetric, no tenderness/mass/nodules, endotracheal tube in place  Lungs: patient intubated with bilateral rhonchi  Heart: regular rate and rhythm, S1, S2 normal, no murmur, click, rub or gallop  Abdomen: soft, non-tender; bowel sounds normal; no masses,  no organomegaly  Extremities: extremities normal, atraumatic, no cyanosis or edema  Pulses: 2+ and symmetric  Skin: Skin color, texture, turgor normal. No rashes or lesions  Neurologic:  remains comatose no purposeful response    LABS:    CBC with Differential:    Lab Results   Component Value Date    WBC 15.9 03/14/2018    RBC 2.79 03/14/2018    HGB 7.8 03/14/2018    HCT 24.7 03/14/2018     03/14/2018    MCV 88.5 03/14/2018    MCH 28.0 03/14/2018    MCHC 31.6 03/14/2018    RDW 14.8 03/14/2018    SEGSPCT 79 01/08/2013    LYMPHOPCT 7.5 03/14/2018    MONOPCT 5.4 03/14/2018    BASOPCT 0.1 03/14/2018    MONOSABS 0.86 03/14/2018    LYMPHSABS 1.20 03/14/2018    EOSABS 0.18 03/14/2018    BASOSABS 0.01 03/14/2018     CMP:    Lab Results   Component Value Date     03/14/2018    K 4.4 03/14/2018    K 3.2 03/07/2018     03/14/2018    CO2 28 03/14/2018    BUN 36 03/14/2018    CREATININE 0.9 03/14/2018    GFRAA >60 03/14/2018    LABGLOM >60 03/14/2018 GLUCOSE 124 03/14/2018    GLUCOSE 113 05/04/2012    PROT 6.3 03/07/2018    LABALBU 3.1 03/07/2018    CALCIUM 8.6 03/14/2018    BILITOT 0.3 03/07/2018    ALKPHOS 71 03/07/2018    AST 19 03/07/2018    ALT 14 03/07/2018     Hepatic Function Panel:    Lab Results   Component Value Date    ALKPHOS 71 03/07/2018    ALT 14 03/07/2018    AST 19 03/07/2018    PROT 6.3 03/07/2018    BILITOT 0.3 03/07/2018    BILIDIR <0.2 06/03/2016    IBILI 0.0 06/03/2016    LABALBU 3.1 03/07/2018     Magnesium:    Lab Results   Component Value Date    MG 2.4 03/14/2018     Phosphorus:    Lab Results   Component Value Date    PHOS 2.7 03/14/2018     PT/INR:    Lab Results   Component Value Date    PROTIME 14.2 03/07/2018    PROTIME 22.5 02/06/2017    INR 1.3 03/07/2018     PTT:    Lab Results   Component Value Date    APTT 66.4 12/18/2016   [APTT}  Troponin:    Lab Results   Component Value Date    TROPONINI 0.02 03/06/2018     U/A:    Lab Results   Component Value Date    COLORU Yellow 03/14/2018    PROTEINU 30 03/14/2018    PHUR >=9.0 03/14/2018    WBCUA 1-3 03/06/2018    RBCUA 0-1 03/06/2018    RBCUA 10-20 01/09/2013    BACTERIA NONE 03/06/2018    CLARITYU TURBID 03/14/2018    SPECGRAV <=1.005 03/14/2018    LEUKOCYTESUR LARGE 03/14/2018    UROBILINOGEN 1.0 03/14/2018    BILIRUBINUR Negative 03/14/2018    BLOODU TRACE 03/14/2018    GLUCOSEU Negative 03/14/2018         Problem list:    Patient Active Problem List   Diagnosis    Alzheimer's dementia    Hypokalemia    Mental retardation    Ulcer of right heel ([address]    Pulmonary embolism [facility] [address]    Acute deep vein thrombosis (DVT) of right lower extremity ([address]    Acute cystitis without hematuria    HTN (hypertension), benign    KIMBERLY (acute kidney injury) ([facility] [address]    Diabetes mellitus type 2, controlled ([address]    Subdural hematoma ([address]         ASSESSMENT:      1. Acute subdural hematoma status post craniotomy    2. Seizure disorder likely secondary to subdural hematoma    3.  Alzheimer's

## 2018-03-14 NOTE — PROGRESS NOTES
Neurosurg progress note  VITALS:  BP (!) 120/55   Pulse 93   Temp 100.2 °F (37.9 °C) (Temporal)   Resp 16   Ht 5' 3\" (1.6 m) Comment: obtained per EMR on last admit 2017   Wt 131 lb (59.4 kg)   SpO2 97%   BMI 23.21 kg/m²   24HR INTAKE/OUTPUT:    Intake/Output Summary (Last 24 hours) at 18 0825  Last data filed at 18 0600   Gross per 24 hour   Intake              551 ml   Output             1860 ml   Net            -1309 ml     Ct Head Wo Contrast    Result Date: 3/7/2018  Patient MRN:  [id number] : 1945 Age: 74 years Gender: Female Order Date:  3/7/2018 5:23 AM EXAM: CT HEAD WO CONTRAST NUMBER OF IMAGES:  127 INDICATION:  post op  COMPARISON: 3/6/2018 TECHNIQUE: Routine axial images through the head without contrast. Coronal and sagittal reformations. Low-dose CT  acquisition technique included one of following options: 1 . Automated exposure control 2. Adjustment of MA and or KV according to patient's size or 3. Use of iterative reconstruction. FINDINGS:  Interval wide right craniotomy for evacuation right subdural hematoma. Residual right subdural collection which is a mixture of air, blood and fluid has a maximal thickness of 19 mm on slice 31 of series 4 compared to prior measurement of 26 mm. Small amount of subdural blood along the right falx is similar. Associated local mass effect with compression right lateral ventricle and dilation left lateral ventricle from outflow obstruction. Midline shift to the left is 10 mm compared to prior measurement of 14 mm. Slight shift of the uncus from right to left. Improving. Cisternal spaces are otherwise prominent from volume loss. Mild periventricular and subcortical white matter hypodensities. No dominant mass. Mild mucosal thickening ethmoid air cells. Old left medial orbital wall fracture. Otherwise, Imaged paranasal sinuses and mastoid air cells are clear. Intraorbital contents are unremarkable.  Right scalp soft tissue swelling/hematoma and emphysema. 1. Postoperative changes from evacuation right subdural hematoma as described above. Residual collection as described above with improving midline shift to the left now measuring 10 mm. Continued compression right lateral ventricle and dilation left lateral ventricle from outflow obstruction. Ct Head Wo Contrast    Result Date: 3/6/2018  Patient MRN:  [id number] : 1945 Age: 74 years Gender: Female Order Date:  3/6/2018 6:19 PM EXAM: CT HEAD WO CONTRAST NUMBER OF IMAGES:  107 INDICATION:  altered mental status COMPARISON: Head CT dated 2016. TECHNIQUE:  Computed tomography of the head was performed without intravenous contrast. Images were constructed in the axial plane. Multiplanar reformation of the axial images was performed in coronal and sagittal planes. Low-dose CT acquisition technique included one of following options: (1) automated exposure control;  (2) adjustment of mA and/or kV according to patient's size; or (3) use of iterative reconstruction. FINDINGS: There is a very large acute subdural hematoma extending over the lateral convexities of the right frontal, temporal, and parietal lobes with extension along the anterior falx. There is associated significant mass effect on the right cerebral hemisphere with significant effacement of the right lateral and third ventricles. The left lateral ventricle is dilated and may be entrapped. There is up to 12 mm of leftward midline shift with trans-falcine herniation. Very large acute subdural hematoma over the right cerebral hemisphere and anterior falx cerebri. Significant associated mass effect. Effacement of the right lateral and third ventricles and possible entrapment of the left lateral ventricle. Leftward midline shift up to 12 mm with trans-falcine herniation. The findings were discussed with Dr. Narda Thomas of the [facility] ED at approximately 1830 hours on 3/6/2018.     Xr Chest Portable    Result Date: mg Per NG tube Daily    donepezil  10 mg Per NG tube Nightly    ferrous sulfate  300 mg Per NG tube BID    metoprolol tartrate  25 mg Per NG tube BID    valsartan  320 mg Per NG tube Daily    And    hydrochlorothiazide  25 mg Oral Daily    pantoprazole  40 mg Intravenous Daily    And    sodium chloride (PF)  10 mL Intravenous Daily    sodium chloride flush  10 mL Intravenous 2 times per day    insulin lispro  0-18 Units Subcutaneous Q4H    magnesium oxide  400 mg Per NG tube Daily    docusate sodium  100 mg Per NG tube Daily     Remains intubated/unconscious, wound intact,perrl  Assessment:  Patient Active Problem List   Diagnosis    Alzheimer's dementia    Hypokalemia    Mental retardation    Ulcer of right heel (HCC)    Pulmonary embolism (HCC)    Acute deep vein thrombosis (DVT) of right lower extremity (HCC)    Acute cystitis without hematuria    HTN (hypertension), benign    KIMBERLY (acute kidney injury) ([address]    Diabetes mellitus type 2, controlled ([address]    Subdural hematoma ([address]     Plan:Head ct in am Continue current care  Roz Mclean M.D.

## 2018-03-14 NOTE — PROGRESS NOTES
Nutrition Assessment    Type and Reason for Visit: Reassess    Nutrition Recommendations: Continue current TF    Malnutrition Assessment:  · Malnutrition Status: Meets the criteria for moderate malnutrition  · Context: Chronic illness  · Findings of the 6 clinical characteristics of malnutrition (Minimum of 2 out of 6 clinical characteristics is required to make the diagnosis of moderate or severe Protein Calorie Malnutrition based on AND/ASPEN Guidelines):  1. Energy Intake-Not available      2. Weight Loss-Unable to assess    3. Fat Loss-Moderate subcutaneous fat loss, Orbital  4. Muscle Loss-Moderate muscle mass loss, Temples (temporalis muscle), Clavicles (pectoralis and deltoids)  5. Fluid Accumulation-No significant fluid accumulation    6.  Strength-Not measured    Nutrition Diagnosis:   · Problem:  Moderate malnutrition, in context of chronic illness  · Etiology: related to Cognitive or neurological impairment     Signs and symptoms:  as evidenced by Nutrition support - EN, NPO status due to medical condition, Moderate loss of subcutaneous fat, Moderate muscle loss    Nutrition Assessment:  · Nutrition-Focused Physical Findings: vent, soft abd, active BS, no noted edema, +I/Os, s/p crani  · Wound Type: Surgical Wound  · Current Nutrition Therapies:  · Oral Diet Orders: NPO   · Tube Feeding (TF) Orders:   · Feeding Route: Nasogastric  · Formula: Diabetic  · Rate (ml/hr):40 ml/hr    · Volume (ml/day): 960 ml TV  · Duration: Continuous 24hrs  · Additives/Modulars: Protein (daily)  · TF Residuals: Less than or equal to 250ml  · Water Flushes: Per Nursing Protocol  · Current TF & Flush Orders Provides: 1152 kcal, 58 gm pro, 773 ml free water (1256 kcal, 84 gm pro w/ daily pro mod)  · Anthropometric Measures:  · Ht: 5' 3\" (160 cm) (obtained per EMR on last admit 9/2017 )   · Current Body Wt: 125 lb (56.7 kg) (bed scale 3/14)  · Admission Body Wt: 131 lb (59.4 kg) (3/8 first actual taken )  · Usual

## 2018-03-15 NOTE — PROGRESS NOTES
Neurosurg progress note  VITALS:  BP (!) 145/75   Pulse 83   Temp 99.9 °F (37.7 °C)   Resp 18   Ht 5' 3\" (1.6 m) Comment: obtained per EMR on last admit 2017   Wt 125 lb 12.8 oz (57.1 kg)   SpO2 100%   BMI 22.28 kg/m²   24HR INTAKE/OUTPUT:    Intake/Output Summary (Last 24 hours) at 03/15/18 0803  Last data filed at 03/15/18 0544   Gross per 24 hour   Intake             1415 ml   Output             1321 ml   Net               94 ml     Ct Head Wo Contrast    Result Date: 3/7/2018  Patient MRN:  [id number] : 1945 Age: 74 years Gender: Female Order Date:  3/7/2018 5:23 AM EXAM: CT HEAD WO CONTRAST NUMBER OF IMAGES:  127 INDICATION:  post op  COMPARISON: 3/6/2018 TECHNIQUE: Routine axial images through the head without contrast. Coronal and sagittal reformations. Low-dose CT  acquisition technique included one of following options: 1 . Automated exposure control 2. Adjustment of MA and or KV according to patient's size or 3. Use of iterative reconstruction. FINDINGS:  Interval wide right craniotomy for evacuation right subdural hematoma. Residual right subdural collection which is a mixture of air, blood and fluid has a maximal thickness of 19 mm on slice 31 of series 4 compared to prior measurement of 26 mm. Small amount of subdural blood along the right falx is similar. Associated local mass effect with compression right lateral ventricle and dilation left lateral ventricle from outflow obstruction. Midline shift to the left is 10 mm compared to prior measurement of 14 mm. Slight shift of the uncus from right to left. Improving. Cisternal spaces are otherwise prominent from volume loss. Mild periventricular and subcortical white matter hypodensities. No dominant mass. Mild mucosal thickening ethmoid air cells. Old left medial orbital wall fracture. Otherwise, Imaged paranasal sinuses and mastoid air cells are clear. Intraorbital contents are unremarkable.  Right scalp soft tissue swelling/hematoma and 3/6/2018  Patient MRN:  [id number] : 1945 Age: 74 years Gender: Female Order Date:  3/6/2018 5:37 PM EXAM: XR CHEST PORTABLE NUMBER OF IMAGES:  1, VIEWS:  1 INDICATION: Emesis, Possible Aspiration COMPARISON: Chest x-ray dated 2016. Findings: Evaluation is limited by suboptimal patient positioning. A hand obscures the lower left hemithorax. A left-sided central venous catheter extends to the upper superior vena cava. The visualized lungs are without evidence of focal consolidation. The pulmonary vasculature and cardiomediastinal silhouette appear within normal limits. Limited examination. Xr Chest Abdomen Ng Placement    Result Date: 3/7/2018  Patient MRN:  [id number] : 1945 Age: 74 years Gender: Female Order Date:  3/7/2018 10:02 AM EXAM: XR CHEST ABDOMEN NG PLACEMENT NUMBER OF IMAGES:  1 INDICATION:  NG placement  COMPARISON: 2016 FINDINGS:  Nasogastric tube overlies the stomach. Limited due to patient rotation. Nonspecific bowel gas pattern. 1. Nasogastric tube overlies the stomach. Suggest repeat film with less rotation to confirm.      CBC:   Lab Results   Component Value Date    WBC 17.2 03/15/2018    RBC 2.67 03/15/2018    HGB 7.5 03/15/2018    HCT 23.8 03/15/2018    MCV 89.1 03/15/2018    MCH 28.1 03/15/2018    MCHC 31.5 03/15/2018    RDW 14.9 03/15/2018     03/15/2018    MPV 9.9 03/15/2018     BMP:    Lab Results   Component Value Date     03/15/2018    K 4.3 03/15/2018    K 3.2 2018     03/15/2018    CO2 27 03/15/2018    BUN 37 03/15/2018    LABALBU 3.1 2018    CREATININE 1.0 03/15/2018    CALCIUM 8.5 03/15/2018    GFRAA >60 03/15/2018    LABGLOM >60 03/15/2018    GLUCOSE 104 03/15/2018    GLUCOSE 113 2012      insulin glargine  10 Units Subcutaneous Nightly    levETIRAcetam in NaCl  1,000 mg Intravenous BID    heparin (porcine)  5,000 Units Subcutaneous 3 times per day    chlorhexidine  15 mL Mouth/Throat BID    amLODIPine  2.5

## 2018-03-15 NOTE — PROGRESS NOTES
03/15/18 1104   Updated: 03/15/18 1106    Narrative:     Patient MRN:  [id number]  : 1945  Age: 74 years  Gender: Female    Order Date:  3/15/2018 10:45 AM    EXAM: XR CHEST ABDOMEN NG PLACEMENT    NUMBER OF VIEWS:  1    INDICATION:  NG placement   NG placement    COMPARISON: 3/7/2018      FINDINGS:  Nasogastric tube courses below the level of the hemidiaphragm with  distal tip located within the region of the antrum of the stomach. Impression:     Satisfactory position of nasogastric/orogastric tube. XR CHEST PORTABLE [[id number]] Resulted: 03/15/18 0817   Updated: 03/15/18 0819    Narrative:     Patient MRN:  [id number]  : 1945  Age: 74 years  Gender: Female    Order Date:  3/15/2018 5:30 AM    EXAM: XR CHEST PORTABLE    NUMBER OF IMAGES:  1 view    INDICATION: Patient is a 75-year-old woman with history of  hypertension, diabetes mellitus,  ET Placement     COMPARISON: Chest x-ray 2018    FINDINGS:  Endotracheal tube is present with tip projecting 1.5 cm  above the margot. There is a nasogastric tube with tip in the gastric  fundus in good position. Left chest wall Port-A-Cath similar to  previously study. Cardiomediastinal silhouette is intact. Atherosclerotic calcifications  of the aortic arch. No pleural effusion or pneumothorax. Mild  consolidation and airspace opacities of the left lung base similar to  previously study. Otherwise lungs are clear. Upper abdomen is unremarkable. Degenerative disc disease of the  thoracic spine. Impression:       1. Good position of the support tubes and lines. 2. Mild consolidation and airspace opacities of the left lung base  similar to previously study.          POCT Glucose [[id number]] (Abnormal) Collected: 03/15/18 0807   Updated: 03/15/18 0810     Meter Glucose 125 (H) mg/dL   Magnesium [[id number]] Collected: 03/15/18 0420   Updated: 03/15/18 0601    Specimen Source: Blood     Magnesium 2.5 mg/dL   Narrative:      34:85   Basic metabolic panel [[id number]] (Abnormal) Collected: 03/15/18 0420   Updated: 03/15/18 0601    Specimen Source: Blood     Sodium 139 mmol/L    Potassium 4.3 mmol/L    Chloride 100 mmol/L    CO2 27 mmol/L    Anion Gap 12 mmol/L    Glucose 104 mg/dL    BUN 37 (H) mg/dL    CREATININE 1.0 mg/dL    GFR Non-African American >60 mL/min/1.73    Comment: Chronic Kidney Disease: less than 60 ml/min/1.73 sq. m.         Kidney Failure: less than 15 ml/min/1.73 sq.m. Results valid for patients 18 years and older.         GFR African American >60    Calcium 8.5 (L) mg/dL   Narrative:      04:58   Phosphorus [[id number]] Collected: 03/15/18 0420   Updated: 03/15/18 0601    Specimen Type: Blood    Specimen Source: Blood     Phosphorus 3.4 mg/dL   Narrative:      04:58   CBC Auto Differential [[id number]] (Abnormal) Collected: 03/15/18 0420   Updated: 03/15/18 0523    Specimen Source: Blood     WBC 17.2 (H) E9/L    RBC 2.67 (L) E12/L    Hemoglobin 7.5 (L) g/dL    Hematocrit 23.8 (L) %    MCV 89.1 fL    MCH 28.1 pg    MCHC 31.5 (L) %    RDW 14.9 fL    Platelets 439 Y4/I    MPV 9.9 fL    Neutrophils % 83.3 (H) %    Immature Granulocytes % 1.3 %    Lymphocytes % 8.5 (L) %    Monocytes % 5.1 %    Eosinophils % 1.7 %    Basophils % 0.1 %    Neutrophils # 14.32 (H) E9/L    Immature Granulocytes # 0.23 E9/L    Lymphocytes # 1.46 (L) E9/L    Monocytes # 0.87 E9/L    Eosinophils # 0.30 E9/L    Basophils # 0.01 E9/L   Narrative:      04:58   POCT Glucose [[id number]] (Abnormal) Collected: 03/15/18 0424   Updated: 03/15/18 0429     Meter Glucose 118 (H) mg/dL   Blood gas, arterial [[id number]] (Abnormal) Collected: 03/15/18 0420   Updated: 03/15/18 0424    Specimen Source: Arterial Blood     Date Analyzed [id number]    Time Analyzed 0421    Source: Blood Arterial    pH, Blood Gas 7.518 (H)    PCO2 35.3 mmHg    PO2 146.5 (H) mmHg    HCO3 28.0 (H) mmol/L    B.E. 4.9 (H) mmol/L    O2 Sat 99.1 (H) %    PO2/FIO2 3.66 mmHg/%    AaDO2 88.1 mmHg    RI(T) 0.60    O2Hb injection 40 mg  40 mg Intravenous Daily Eliezer Ever, NP   40 mg at 03/15/18 7558    And    sodium chloride (PF) 0.9 % injection 10 mL  10 mL Intravenous Daily Eliezer Ever, NP   10 mL at 03/15/18 2039    bisacodyl (DULCOLAX) suppository 10 mg  10 mg Rectal Daily PRN Dee Dee Mtz MD        sodium chloride flush 0.9 % injection 10 mL  10 mL Intravenous 2 times per day Dee Dee Mtz MD   10 mL at 03/15/18 0904    sodium chloride flush 0.9 % injection 10 mL  10 mL Intravenous PRN Dee Dee Mtz MD        [facility] 4 mg  4 mg Intravenous Q6H PRN Dee Dee Mtz MD        labetalol (NORMODYNE;TRANDATE) injection 10 mg  10 mg Intravenous Q10 Min PRN Saturnino Laureano MD   10 mg at 03/14/18 0215    heparin flush 100 UNIT/ML injection 500 Units  500 Units Intercatheter PRN Adra Osborn, CNP   500 Units at 03/09/18 0844    glucose (GLUTOSE) 40 % oral gel 15 g  15 g Oral PRN Adra Osborn, CNP        dextrose 50 % solution 12.5 g  12.5 g Intravenous PRN Adra Osborn, CNP        glucagon (rDNA) injection 1 mg  1 mg Intramuscular PRN Adra Osborn, CNP        dextrose 5 % solution  100 mL/hr Intravenous PRN Adra Osborn, CNP        insulin lispro (HUMALOG) injection vial 0-18 Units  0-18 Units Subcutaneous Q4H Adra Osborn, CNP   3 Units at 03/15/18 1228    magnesium oxide (MAG-OX) tablet 400 mg  400 mg Per NG tube Daily Adra Osborn, CNP   400 mg at 03/15/18 0859    docusate sodium (COLACE) 150 MG/15ML liquid 100 mg  100 mg Per NG tube Daily Eliezer Ever, NP   100 mg at 03/15/18 8944    hydrALAZINE (APRESOLINE) injection 10 mg  10 mg Intravenous Q10 Min PRN Adra Osborn, CNP   10 mg at 03/10/18 6490       Problem list:    Patient Active Problem List   Diagnosis    Alzheimer's dementia    Hypokalemia    Mental retardation    Ulcer of right heel (HCC)    Pulmonary embolism (HCC)    Acute deep vein thrombosis (DVT) of right lower extremity (HCC)    Acute cystitis without hematuria

## 2018-03-15 NOTE — PROGRESS NOTES
[facility] Int[facility] Unit  Critical Care Consult  Daily Progress Note 3/15/2018    Date of Admission: 3/6/18    SURGICAL/INTERVENTIONAL PROCEDURES:   3/6 Stat right fronto temporoparietal craniotomy for evac SDH  3/9  Intubation    OVERNIGHT EVENTS: Patient with seizure activity this AM, resolved with Ativan, repeat CT head redemonstrating SDH. Afebrile, vital signs stable, 9 units of insulin administered in the last 24 hours     PHYSICAL EXAM:    BP (!) 145/75   Pulse 90   Temp 99.1 °F (37.3 °C)   Resp 17   Ht 5' 3\" (1.6 m) Comment: obtained per EMR on last admit 9/2017   Wt 125 lb 12.8 oz (57.1 kg)   SpO2 100%   BMI 22.28 kg/m²     General appearance:  Comfortable. Pain Description: no outward signs of discomfort  GCS:    1 - Does not open eyes   5 - Pushes away noxious stimulus  1-T - Makes no noise    Pupil size:  Left 2 mm  Right 2 mm  Pupil reaction: Yes  Wiggles fingers: Left No Right No  Hand grasp:   Left absent     Right absent  Wiggles toes: Left No    Right No  Plantar flexion: Left absent    Right absent    CONSTITUTIONAL: no acute distress, lying in hospital bed, no sedation  CARDIOVASCULAR: S1 S2, regular rate, regular rhythm, no murmur/gallop/rub. Monitor: Sinus  PULMONARY: no rhonchi/rales/wheezes, no use of accessory muscles, PS 10/5,   RENAL: acosta to gravity, clear yellow urine  ABDOMEN: soft, nontender, nondistended, nontympanic, normal bowel sounds   SKIN/EXTREMITIES: no rashes/ecchymosis, no edema/clubbing, warm/dry, good capillary refill     LINES: [facility] accessed 3/7    CONSULTS:   Medicine  Neurosurgery      ASSESSMENT/PLAN:       · Neuro: Seizure, acute SDH s/p crani frontotemporoparietal SDH evac, Hx Dementia, Stroke, MR. Neurosurgery following. Monitor neuro status, Aricept, increase Keppra, EEG pending, Ativan PRN for seizure  · CV: No acute issues HX of HTN, hyperlipidemia, CAD, CHF. Monitor hemodynamics. BP goal < 140. PRN hydralazine & labetalol. Amlodipine, metoprolol   · Pulm: Acute respiratory failure s/p intubation. Monitor RR & SpO2. Daily CXR. Daily ABG. Duoneb. Will need Trach  · GI: No acute issues. Gastric tube. NPO. Tube feeds. Monitor bowel function. Will need PEG  · Renal: No acute issues. Monitor BUN & Cr. Monitor electrolytes & replace as needed. Monitor urine output. · ID: Leukocytosis. Monitor for SIRS. · Endocrine: Hyperglycemia. HX of diabetes. Monitor BS. ISS. · MSK: No acute issues. ROM. turn & reposition. Routine skin care to prevent breakdown  · Heme: No acute issues. Monitor CBC. Bowel regime: Colace, MOM  Pain control/Sedation: Fentanyl,  Tylenol, Oxycodone   DVT prophylaxis: SCDs. Heparin  GI prophylaxis: Protonix,  TF  Glucose protocol:  ISS  Mouth/Eye care: artificial tears. Peridex  Gil: Keep in place for critical care monitoring of fluid balance. Family update:  Will call guardian to discuss Trach/PEG  Code status:  Full  Disposition:  NSICU      Electronically signed by Zina Cedeño NP on 3/15/2018 at 9:53 AM

## 2018-03-16 NOTE — PROGRESS NOTES
O2 Sat Latest Ref Range: 92.0 - 98.5 % 99.5 (H)   tHb (est) Latest Ref Range: 11.5 - 16.5 g/dL 9.1 (L)   O2Hb Latest Ref Range: 94.0 - 97.0 % 98.2 (H)   COHb Latest Ref Range: 0.0 - 1.5 % 0.9   MetHb Latest Ref Range: 0.0 - 1.5 % 0.4   HHb Latest Ref Range: 0.0 - 5.0 % 0.5   O2 Content Latest Units: mL/dL 12.9   AaDO2 Latest Units: mmHg 94.6   RI(T) Unknown 0.68   FIO2 Latest Units: % 40.0   PO2/FIO2 Latest Units: mmHg/% 3.49   Pt Temp Latest Units: C 37.0   Critical(s) Notified Unknown . No Critical Values   Time Analyzed Unknown 0513   Mode Unknown PSV   Peep/Cpap Latest Units: cmH? O 5.0   PS Latest Units: cmH20 10     Narrative   Patient MRN:  [id number]   : 1945   Age: 74 years   Gender: Female       Order Date:  3/16/2018 5:30 AM       EXAM: XR CHEST PORTABLE       NUMBER OF IMAGES:  1       INDICATION:  ET Placement    ET Placement        COMPARISON: March 15, 2018       Patient MRN:  [id number]   : 1945   Age: 74 years   Gender: Female       Order Date:  3/16/2018 5:30 AM       EXAM: XR CHEST PORTABLE       NUMBER OF IMAGES:  1       INDICATION:  ET Placement    ET Placement        COMPARISON: 2018       TECHNIQUE:   AP view of the chest.       FINDINGS:   Nasogastric tube terminates below the diaphragm and field-of-view. Lines and tubes are otherwise unchanged in position. Stable   cardiomediastinal silhouette and pulmonary vasculature. Left basilar   opacity again noted. No pneumothorax seen. There are degenerative   changes in the shoulders and spine.            Impression   Left basilar opacity again noted. Differential includes   atelectasis/scarring, infection, and/or layering pleural effusion.        Current Facility-Administered Medications   Medication Dose Route Frequency Provider Last Rate Last Dose    clog zapper (CLOG ZAPPER) kit 10 mL  10 mL Per NG tube Once Avelina Race, NP        levetiracetam (KEPPRA) 1500 mg/100 mL IVPB  1,500 mg Intravenous BID Avelina Race, NP MD Julien        labetalol (NORMODYNE;TRANDATE) injection 10 mg  10 mg Intravenous Q10 Min PRN Swathi Caraballo MD   10 mg at 03/14/18 0215    heparin flush 100 UNIT/ML injection 500 Units  500 Units Intercatheter PRN Corbin Lopez CNP   500 Units at 03/09/18 0844    glucose (GLUTOSE) 40 % oral gel 15 g  15 g Oral PRN Corbin Lopez CNP        dextrose 50 % solution 12.5 g  12.5 g Intravenous PRN Corbin Lopez CNP        glucagon (rDNA) injection 1 mg  1 mg Intramuscular PRN Corbin Lopez CNP        dextrose 5 % solution  100 mL/hr Intravenous PRN Corbin Lopez CNP        insulin lispro (HUMALOG) injection vial 0-18 Units  0-18 Units Subcutaneous Q4H Corbin Lopez CNP   3 Units at 03/16/18 0743    magnesium oxide (MAG-OX) tablet 400 mg  400 mg Per NG tube Daily Corbin Lopez CNP   400 mg at 03/16/18 3008    docusate sodium (COLACE) 150 MG/15ML liquid 100 mg  100 mg Per NG tube Daily Michael Kurtz NP   100 mg at 03/16/18 4151    hydrALAZINE (APRESOLINE) injection 10 mg  10 mg Intravenous Q10 Min PRN Corbin Lopez CNP   10 mg at 03/10/18 2344       Problem list:    Patient Active Problem List   Diagnosis    Alzheimer's dementia    Hypokalemia    Mental retardation    Ulcer of right heel (HCC)    Pulmonary embolism (HCC)    Acute deep vein thrombosis (DVT) of right lower extremity (HCC)    Acute cystitis without hematuria    HTN (hypertension), benign    [facility] [facility]    Diabetes mellitus type 2, controlled ([facility]    Subdural hematoma (HCC)       Assessment:       1. Acute subdural hematoma status post craniotomy     2. Seizure disorder likely secondary to subdural hematoma     3. Alzheimer's dementia     4. Mental retardation     5. Diabetes mellitus type II uncontrolled     6. Essential hypertension    Plan:    1. Continue ventilatory support    2. Continue nutritional support    3.  Awaiting decision regarding trach/PEG      Mehnaz Aquino D.O.  2:36 PM  3/16/2018

## 2018-03-17 NOTE — PROGRESS NOTES
10 Units Subcutaneous Nightly    chlorhexidine  15 mL Mouth/Throat BID    amLODIPine  2.5 mg Per NG tube Daily    donepezil  10 mg Per NG tube Nightly    ferrous sulfate  300 mg Per NG tube BID    metoprolol tartrate  25 mg Per NG tube BID    pantoprazole  40 mg Intravenous Daily    And    sodium chloride (PF)  10 mL Intravenous Daily    sodium chloride flush  10 mL Intravenous 2 times per day    insulin lispro  0-18 Units Subcutaneous Q4H    magnesium oxide  400 mg Per NG tube Daily    docusate sodium  100 mg Per NG tube Daily     Intubated, perrl,  Assessment:  Patient Active Problem List   Diagnosis    Alzheimer's dementia    Hypokalemia    Mental retardation    Ulcer of right heel (HCC)    Pulmonary embolism (HCC)    Acute deep vein thrombosis (DVT) of right lower extremity (HCC)    Acute cystitis without hematuria    HTN (hypertension), benign    [facility] [address]    Diabetes mellitus type 2, controlled ([facility] [address]    Subdural hematoma (HCC)     Plan:Continue current care  Isabel Primrose M.D.

## 2018-03-17 NOTE — PROGRESS NOTES
[facility] Intensive Care Unit  Critical Care  Daily Progress Note 3/17/2018    Date of Admission: 03/08/2018    CC: Follow up for subdural hematoma. Patient Active Problem List   Diagnosis    Alzheimer's dementia    Hypokalemia    Mental retardation    Ulcer of right heel ([address]    Pulmonary embolism (HCC)    Acute deep vein thrombosis (DVT) of right lower extremity (HCC)    Acute cystitis without hematuria    HTN (hypertension), benign    KIMBERLY (acute kidney injury) ([address]    Diabetes mellitus type 2, controlled ([address]    Subdural hematoma ([address]     SURGICAL/INTERVENTIONAL PROCEDURES:   03/06/2018 Stat right fronto temporoparietal craniotomy for evac SDH  03/09/2018  Intubation    OVERNIGHT EVENTS: T max 100.4 F. She did not require any antihypertensive agents in the previous 24 hours. She did require 9 units of insulin in the previous 24 hours. She has not required any ativan in the previous 24 hours. PHYSICAL EXAM:    /73   Pulse 81   Temp 97.9 °F (36.6 °C) (Temporal)   Resp 19   Ht 5' 3\" (1.6 m) Comment: obtained per EMR on last admit 9/2017   Wt 125 lb 12.8 oz (57.1 kg)   SpO2 100%   BMI 22.28 kg/m²     Intake/Output Summary (Last 24 hours) at 03/17/18 0827  Last data filed at 03/17/18 0600   Gross per 24 hour   Intake             1719 ml   Output             1565 ml   Net              154 ml      General appearance:  Comfortable. NEUROLOGIC:   RASS Score:  -3. GCS:    1 - Does not open eyes   4 - Moves part of body but does not remove noxious stimulus  1 - Makes no noise       Pupil size:  Left 3 mm  Right 4 mm  Pupil reaction: Yes   PERRLA  Wiggles fingers: Left No Right No  Hand grasp:   Left none     Right none  Wiggles toes: Left no  Right non  Plantar flexion: Left none    Right none  Crani incision:  CDI  CONSTITUTIONAL: No acute distress, lying in hospital bed. CARDIOVASCULAR: S1 S2, regular rate, regular rhythm, no murmur/gallop/rub.  Monitor: NSR  PULMONARY: Bilaterally clear. No rhonchi/rales/wheezes, no use of accessory muscles. Intubated. Mechanical ventilation. Pressure support 10. FiO2 40%. Peep 5cm. PF ratio 349. Suctioned for clear secretions. RENAL:  Gil to gravity, clear yellow urine. Fluid balance for previous 24 hours: + 154 ml. ABDOMEN: Soft, nontender, nondistended, nontympanic, normal bowel sounds. NGT. Tube feeding: Diabetic formula at 40 ml per hour. Last BM 03/14. MUSCULOSKELETAL:  Withdraws all extremities to pain. Intermittent seizures involving the left side of face and going down left arm. SKIN/EXTREMITIES: No rashes/ecchymosis, no edema/clubbing, warm/dry, good capillary refill. Peripheral IVs.  Right upper chest: Implantable port accessed. Recent Labs      03/15/18   0420  03/16/18   0450  03/17/18   0405   WBC  17.2*  13.8*  14.4*   HGB  7.5*  7.3*  7.9*   HCT  23.8*  23.1*  25.8*   MCV  89.1  89.2  90.8   PLT  305  313  342       Recent Labs      03/15/18   0420  03/16/18   0450  03/17/18   0405   NA  139  141  140   K  4.3  4.5  4.7   CO2  27  25  27   PHOS  3.4  2.8  2.9   BUN  37*  31*  37*   CREATININE  1.0  0.9  1.0     ASSESSMENT/PLAN:     Patient Active Problem List   Diagnosis    Alzheimer's dementia    Hypokalemia    Mental retardation    Ulcer of right heel (HCC)    Pulmonary embolism (HCC)    Acute deep vein thrombosis (DVT) of right lower extremity (HCC)    Acute cystitis without hematuria    HTN (hypertension), benign    [facility] [address]    Diabetes mellitus type 2, controlled (HCC)    Subdural hematoma (HCC)     Neuro:  Seizures. Acute subdural hematoma. S/P craniotomy for evacuation of right frontal temporal parietal subdural hematoma. Hx of dementia, stroke, cataracts and mental retardation - Monitor neuro status. Neurosurgery following. Neurology following. Keppra for seizure prophylaxis. Ativan PRN for seizures. Aricept. CV: No acute issues.   HX

## 2018-03-17 NOTE — PROGRESS NOTES
DAILY VENTILATOR WEANING ASSESSMENT PERFORMED    P/FIO2 Ratio =  No am abg        (<100= do not Wean)                  Cs =  72                        (<32= Instability)  Plat. Pressure = 14  MV =6.38  RSBI =    Instabilities:       Cardiovascular =       CNS =2       Respiratory =       Metabolic =    Parameters    no    Wean per protocol  no    Ask Physician for a weaning plan no    Additional Comments:     Performed by Sha Estrada RRT      Reference Table:    Cardiovascular     CNS      1. Mean BP less than or equal to 75   1. Neuromuscular blockade  2. Heart Rate greater than 130   2. RASS of -3, -4, -5  3. Myocardial Ischemia    3. RASS of +3, +4  4. Mechanical Assist Device    4. ICP greater than 15 or             Intracranial Hypertension         Respiratory      Metabolic  1. PEEP equal to or greater than 10cm/H20  1. Temp. (8hrs) less than 95 or > 103  2. Respiratory Rate greater than 35   2. WBC < 5000 or > 96793  3. Minute Volume greater than 15L  4. pH less than 7.30  5.  Deteriorating chest X-ray

## 2018-03-18 NOTE — PROGRESS NOTES
[facility] Science Intensive Care Unit  Critical Care  Daily Progress Note 3/18/2018    Date of Admission: 03/08/2018    CC: Follow up for subdural hematoma. Patient Active Problem List   Diagnosis    Alzheimer's dementia    Hypokalemia    Mental retardation    Ulcer of right heel ([address]    Pulmonary embolism (HCC)    Acute deep vein thrombosis (DVT) of right lower extremity (HCC)    Acute cystitis without hematuria    HTN (hypertension), benign    KIMBERLY (acute kidney injury) ([address]    Diabetes mellitus type 2, controlled ([address]    Subdural hematoma ([address]     SURGICAL/INTERVENTIONAL PROCEDURES:   03/06/2018 Stat right fronto temporoparietal craniotomy for evac SDH  03/09/2018  Intubation    OVERNIGHT EVENTS: T max 98.8 F. She did not require any antihypertensive agents in the previous 24 hours. She did require 6 units of insulin in the previous 24 hours. She has not required any ativan for seizures in the previous 24 hours. PHYSICAL EXAM:    /67   Pulse 65   Temp 98.8 °F (37.1 °C) (Temporal)   Resp 15   Ht 5' 3\" (1.6 m) Comment: obtained per EMR on last admit 9/2017   Wt 125 lb 12.8 oz (57.1 kg)   SpO2 100%   BMI 22.28 kg/m²     Intake/Output Summary (Last 24 hours) at 03/18/18 0715  Last data filed at 03/18/18 0600   Gross per 24 hour   Intake             2189 ml   Output             1345 ml   Net              844 ml      General appearance:  Comfortable. NEUROLOGIC:   RASS Score:  -3. GCS:    1 - Does not open eyes   4 - Moves part of body but does not remove noxious stimulus  1 - Makes no noise       Pupil size:  Left 3 mm  Right 4 mm  Pupil reaction: Yes   PERRLA  Wiggles fingers: Left No Right No  Hand grasp:   Left none     Right none  Wiggles toes: Left no  Right non  Plantar flexion: Left none    Right none  Crani incision:  CDI  CONSTITUTIONAL: No acute distress, lying in hospital bed. CARDIOVASCULAR: S1 S2, regular rate, regular rhythm, no murmur/gallop/rub.  Monitor:

## 2018-03-18 NOTE — PROGRESS NOTES
Neurosurg progress note  VITALS:  /71   Pulse 89   Temp 98.2 °F (36.8 °C) (Temporal)   Resp 17   Ht 5' 3\" (1.6 m) Comment: obtained per EMR on last admit 2017   Wt 125 lb 12.8 oz (57.1 kg)   SpO2 100%   BMI 22.28 kg/m²   24HR INTAKE/OUTPUT:    Intake/Output Summary (Last 24 hours) at 18 1120  Last data filed at 18 0800   Gross per 24 hour   Intake             2189 ml   Output             1045 ml   Net             1144 ml     Ct Head Wo Contrast    Result Date: 3/7/2018  Patient MRN:  [id number] : 1945 Age: 74 years Gender: Female Order Date:  3/7/2018 5:23 AM EXAM: CT HEAD WO CONTRAST NUMBER OF IMAGES:  127 INDICATION:  post op  COMPARISON: 3/6/2018 TECHNIQUE: Routine axial images through the head without contrast. Coronal and sagittal reformations. Low-dose CT  acquisition technique included one of following options: 1 . Automated exposure control 2. Adjustment of MA and or KV according to patient's size or 3. Use of iterative reconstruction. FINDINGS:  Interval wide right craniotomy for evacuation right subdural hematoma. Residual right subdural collection which is a mixture of air, blood and fluid has a maximal thickness of 19 mm on slice 31 of series 4 compared to prior measurement of 26 mm. Small amount of subdural blood along the right falx is similar. Associated local mass effect with compression right lateral ventricle and dilation left lateral ventricle from outflow obstruction. Midline shift to the left is 10 mm compared to prior measurement of 14 mm. Slight shift of the uncus from right to left. Improving. Cisternal spaces are otherwise prominent from volume loss. Mild periventricular and subcortical white matter hypodensities. No dominant mass. Mild mucosal thickening ethmoid air cells. Old left medial orbital wall fracture. Otherwise, Imaged paranasal sinuses and mastoid air cells are clear. Intraorbital contents are unremarkable.  Right scalp soft tissue swelling/hematoma and emphysema. 1. Postoperative changes from evacuation right subdural hematoma as described above. Residual collection as described above with improving midline shift to the left now measuring 10 mm. Continued compression right lateral ventricle and dilation left lateral ventricle from outflow obstruction. Ct Head Wo Contrast    Result Date: 3/6/2018  Patient MRN:  [id number] : 1945 Age: 74 years Gender: Female Order Date:  3/6/2018 6:19 PM EXAM: CT HEAD WO CONTRAST NUMBER OF IMAGES:  107 INDICATION:  altered mental status COMPARISON: Head CT dated 2016. TECHNIQUE:  Computed tomography of the head was performed without intravenous contrast. Images were constructed in the axial plane. Multiplanar reformation of the axial images was performed in coronal and sagittal planes. Low-dose CT acquisition technique included one of following options: (1) automated exposure control;  (2) adjustment of mA and/or kV according to patient's size; or (3) use of iterative reconstruction. FINDINGS: There is a very large acute subdural hematoma extending over the lateral convexities of the right frontal, temporal, and parietal lobes with extension along the anterior falx. There is associated significant mass effect on the right cerebral hemisphere with significant effacement of the right lateral and third ventricles. The left lateral ventricle is dilated and may be entrapped. There is up to 12 mm of leftward midline shift with trans-falcine herniation. Very large acute subdural hematoma over the right cerebral hemisphere and anterior falx cerebri. Significant associated mass effect. Effacement of the right lateral and third ventricles and possible entrapment of the left lateral ventricle. Leftward midline shift up to 12 mm with trans-falcine herniation. The findings were discussed with Dr. Kelley Becerra of the [facility] ED at approximately 1830 hours on 3/6/2018.     Xr Chest Portable    Result Date: 3/6/2018  Patient MRN:  [id number] : 1945 Age: 74 years Gender: Female Order Date:  3/6/2018 5:37 PM EXAM: XR CHEST PORTABLE NUMBER OF IMAGES:  1, VIEWS:  1 INDICATION: Emesis, Possible Aspiration COMPARISON: Chest x-ray dated 2016. Findings: Evaluation is limited by suboptimal patient positioning. A hand obscures the lower left hemithorax. A left-sided central venous catheter extends to the upper superior vena cava. The visualized lungs are without evidence of focal consolidation. The pulmonary vasculature and cardiomediastinal silhouette appear within normal limits. Limited examination. Xr Chest Abdomen Ng Placement    Result Date: 3/7/2018  Patient MRN:  [id number] : 1945 Age: 74 years Gender: Female Order Date:  3/7/2018 10:02 AM EXAM: XR CHEST ABDOMEN NG PLACEMENT NUMBER OF IMAGES:  1 INDICATION:  NG placement  COMPARISON: 2016 FINDINGS:  Nasogastric tube overlies the stomach. Limited due to patient rotation. Nonspecific bowel gas pattern. 1. Nasogastric tube overlies the stomach. Suggest repeat film with less rotation to confirm.      CBC:   Lab Results   Component Value Date    WBC 14.4 2018    RBC 2.84 2018    HGB 7.9 2018    HCT 25.8 2018    MCV 90.8 2018    MCH 27.8 2018    MCHC 30.6 2018    RDW 14.7 2018     2018    MPV 9.9 2018     BMP:    Lab Results   Component Value Date     2018    K 4.7 2018    K 3.2 2018     2018    CO2 27 2018    BUN 37 2018    LABALBU 3.1 2018    CREATININE 1.0 2018    CALCIUM 8.9 2018    GFRAA >60 2018    LABGLOM >60 2018    GLUCOSE 138 2018    GLUCOSE 113 2012      famotidine  20 mg Per NG tube BID    levETIRAcetam  1,500 mg Per NG tube BID    Or    levetiracetam  1,500 mg Intravenous BID    sodium chloride  500 mL Intravenous Once    hydrochlorothiazide  25

## 2018-03-19 NOTE — H&P
and reactive. She had been sedated  for intubation, was actually intubated. DIAGNOSIS:  Acute subdural hematoma, right frontoparietal, for a stat  craniotomy.         Mariano Sewell MD    D: 03/19/2018 8:58:15       T: 03/19/2018 10:16:51     [email or web address]  Job#: [id number]     Doc#: [id number]    CC:

## 2018-03-19 NOTE — PROGRESS NOTES
levetiracetam (KEPPRA) 1500 mg/100 mL IVPB  1,500 mg Intravenous BID [facility]        hydrochlorothiazide (HYDRODIURIL) tablet 25 mg  25 mg Per NG tube Daily Mohave Labrador, CNS   Stopped at 03/18/18 1235    And    valsartan (DIOVAN) tablet 320 mg  320 mg Per NG tube Daily Mohave Labrador, CNS   Stopped at 03/18/18 1235    acetaminophen (TYLENOL) 160 MG/5ML solution 650 mg  650 mg Per NG tube Q4H PRN Mohave Labrador, CNS        glycerin moisturizing mouth spray (OASIS) 35 %   Oral PRN Mena Farnsworth NP        insulin glargine (LANTUS) injection vial 10 Units  10 Units Subcutaneous Nightly [facility]   10 Units at 03/18/18 2004    LORazepam (ATIVAN) injection 1 mg  1 mg Intravenous Q1H PRN Mena Farnsworth NP   1 mg at 03/15/18 0758    chlorhexidine (PERIDEX) 0.12 % solution 15 mL  15 mL Mouth/Throat BID Mena Farnsworth NP   15 mL at 03/18/18 2011    lubrifresh P.M. (artificial tears) ophthalmic ointment   Both Eyes Q2H PRN Mena Farnsworth NP        amLODIPine [facility]) tablet 2.5 mg  2.5 mg Per NG tube Daily Mena Farnsworth NP   Stopped at 03/18/18 1235    donepezil (ARICEPT) tablet 10 mg  10 mg Per NG tube Nightly Mena Farnsworth NP   10 mg at 03/18/18 2008    ferrous sulfate 300 (60 Fe) MG/5ML syrup 300 mg  300 mg Per NG tube BID Mena Farnsworth NP   300 mg at 03/18/18 2008    metoprolol tartrate (LOPRESSOR) tablet 25 mg  25 mg Per NG tube BID Mena Farnsworth NP   25 mg at 03/18/18 2008    bisacodyl (DULCOLAX) suppository 10 mg  10 mg Rectal Daily PRN Melina Milian MD        sodium chloride flush 0.9 % injection 10 mL  10 mL Intravenous 2 times per day Melina Milian MD   10 mL at 03/18/18 1002    sodium chloride flush 0.9 % injection 10 mL  10 mL Intravenous PRN Melina Milian MD   10 mL at 03/18/18 5885    ondansetron (ZOFRAN) injection 4 mg  4 mg Intravenous Q6H PRN Melina Milian MD        labetalol (NORMODYNE;TRANDATE) injection 10 mg  10 mg Intravenous Q10 Min PRN Yo Grace MD   10 mg at

## 2018-03-20 NOTE — FLOWSHEET NOTE
Patient transported to [phone] by this RN. Bedside report given to [facility].

## 2018-03-20 NOTE — PROGRESS NOTES
Spoke with Medical Director, Alexander Sharp, at [facility] who gives permission for hospice consult.

## 2018-03-20 NOTE — PROGRESS NOTES
swelling/hematoma and emphysema. 1. Postoperative changes from evacuation right subdural hematoma as described above. Residual collection as described above with improving midline shift to the left now measuring 10 mm. Continued compression right lateral ventricle and dilation left lateral ventricle from outflow obstruction. Ct Head Wo Contrast    Result Date: 3/6/2018  Patient MRN:  [id number] : 1945 Age: 74 years Gender: Female Order Date:  3/6/2018 6:19 PM EXAM: CT HEAD WO CONTRAST NUMBER OF IMAGES:  107 INDICATION:  altered mental status COMPARISON: Head CT dated 2016. TECHNIQUE:  Computed tomography of the head was performed without intravenous contrast. Images were constructed in the axial plane. Multiplanar reformation of the axial images was performed in coronal and sagittal planes. Low-dose CT acquisition technique included one of following options: (1) automated exposure control;  (2) adjustment of mA and/or kV according to patient's size; or (3) use of iterative reconstruction. FINDINGS: There is a very large acute subdural hematoma extending over the lateral convexities of the right frontal, temporal, and parietal lobes with extension along the anterior falx. There is associated significant mass effect on the right cerebral hemisphere with significant effacement of the right lateral and third ventricles. The left lateral ventricle is dilated and may be entrapped. There is up to 12 mm of leftward midline shift with trans-falcine herniation. Very large acute subdural hematoma over the right cerebral hemisphere and anterior falx cerebri. Significant associated mass effect. Effacement of the right lateral and third ventricles and possible entrapment of the left lateral ventricle. Leftward midline shift up to 12 mm with trans-falcine herniation. The findings were discussed with Dr. Traci Velasco of the [facility] ED at approximately 1830 hours on 3/6/2018.     Xr Chest

## 2018-03-21 NOTE — PROGRESS NOTES
Subjective: The patient remains on morphine drip. Having some accessory muscle use this AM, but otherwise appears comfortable. Objective:    BP 96/60   Pulse 104   Temp 97.8 °F (36.6 °C) (Temporal)   Resp 20   Ht 5' 3\" (1.6 m) Comment: obtained per EMR on last admit 9/2017   Wt 125 lb 12.8 oz (57.1 kg)   SpO2 100%   BMI 22.28 kg/m²     Current medications that patient is taking have been reviewed. Heart:  Regular rhythm, tachy, no murmurs, gallops, or rubs.   Lungs:  Bilateral rhonchi, no wheeze, no rales  Abd: bowel sounds present, soft, nondistended, no masses  Extrem:  No clubbing, cyanosis, or edema    No new labs    Assessment:    1) subdural hematoma s/p evacuation of hematoma  2) coma/encephalopathy secondary to above-patient not expected to recover  3) Alzheimer's dementia with change  4) mental retardation, severe  5) acute respiratory failure  6) HTN benign  7) DM 2, controlled  8) acute blood loss anemia  9) functional quadriplegia      Plan:    1) continue current comfort care  2) oral care q shift  3) Hospice to evaluate for transfer to [address]      Dayana Polk MD  8:34 AM  3/21/2018

## 2019-01-30 NOTE — ED NOTES
Bed: 10  Expected date:   Expected time:   Means of arrival:   Comments:  [address] David Evans, RN  03/06/18 3104 Yes

## 2019-06-06 NOTE — PLAN OF CARE
Problem: Falls - Risk of  Goal: Absence of falls  Outcome: Met This Shift
Problem: Falls - Risk of  Goal: Absence of falls  Outcome: Met This Shift      Problem: HEMODYNAMIC STATUS  Goal: Patient has stable vital signs and fluid balance  Outcome: Met This Shift      Problem: ACTIVITY INTOLERANCE/IMPAIRED MOBILITY  Goal: Mobility/activity is maintained at optimum level for patient  Outcome: Ongoing      Problem: Verbal Communication - Impaired:  Goal: Functional communication will improve  Functional communication will improve   Outcome: Not Met This Shift    Goal: Ability to interact with others will improve  Ability to interact with others will improve   Outcome: Not Met This Shift    Goal: Ability to establish a method of communication will improve  Ability to establish a method of communication will improve   Outcome: Not Met This Shift      Problem: Aspiration - Risk of:  Goal: Absence of aspiration  Absence of aspiration   Outcome: Ongoing
Problem: Risk for Impaired Skin Integrity  Goal: Tissue integrity - skin and mucous membranes  Structural intactness and normal physiological function of skin and  mucous membranes.    Outcome: Met This Shift      Problem: Falls - Risk of  Goal: Absence of falls  Outcome: Met This Shift
Problem: Risk for Impaired Skin Integrity  Goal: Tissue integrity - skin and mucous membranes  Structural intactness and normal physiological function of skin and  mucous membranes.    Outcome: Met This Shift      Problem: Falls - Risk of  Goal: Absence of falls  Outcome: Met This Shift      Problem: ACTIVITY INTOLERANCE/IMPAIRED MOBILITY  Goal: Mobility/activity is maintained at optimum level for patient  Outcome: Met This Shift
Problem: Risk for Impaired Skin Integrity  Goal: Tissue integrity - skin and mucous membranes  Structural intactness and normal physiological function of skin and  mucous membranes.    Outcome: Met This Shift      Problem: Falls - Risk of  Goal: Absence of falls  Outcome: Met This Shift      Problem: HEMODYNAMIC STATUS  Goal: Patient has stable vital signs and fluid balance  Outcome: Met This Shift      Problem: ACTIVITY INTOLERANCE/IMPAIRED MOBILITY  Goal: Mobility/activity is maintained at optimum level for patient  Outcome: Met This Shift      Problem: COMMUNICATION IMPAIRMENT  Goal: Ability to express needs and understand communication  Outcome: Not Met This Shift
Problem: Risk for Impaired Skin Integrity  Goal: Tissue integrity - skin and mucous membranes  Structural intactness and normal physiological function of skin and  mucous membranes.    Outcome: Met This Shift      Problem: Falls - Risk of  Goal: Absence of falls  Outcome: Met This Shift      Problem: HEMODYNAMIC STATUS  Goal: Patient has stable vital signs and fluid balance  Outcome: Met This Shift      Problem: ACTIVITY INTOLERANCE/IMPAIRED MOBILITY  Goal: Mobility/activity is maintained at optimum level for patient  Outcome: Met This Shift      Problem: COMMUNICATION IMPAIRMENT  Goal: Ability to express needs and understand communication  Outcome: Not Met This Shift      Problem: Verbal Communication - Impaired:  Goal: Functional communication will improve  Functional communication will improve   Outcome: Not Met This Shift    Goal: Ability to interact with others will improve  Ability to interact with others will improve   Outcome: Not Met This Shift    Goal: Ability to establish a method of communication will improve  Ability to establish a method of communication will improve   Outcome: Not Met This Shift      Problem: Pain:  Goal: Pain level will decrease  Pain level will decrease   Outcome: Met This Shift    Goal: Recognizes and communicates pain  Recognizes and communicates pain   Outcome: Not Met This Shift      Problem: Injury - Risk of, Healthcare-Acquired Condition:  Goal: Absence of healthcare acquired conditions  Absence of healthcare acquired conditions   Outcome: Met This Shift    Goal: Absence of pressure ulcer  Absence of pressure ulcer   Outcome: Met This Shift    Goal: Demonstration of wound healing without infection will improve  Demonstration of wound healing without infection will improve   Outcome: Met This Shift    Goal: Absence of urinary tract infection signs and symptoms  Absence of urinary tract infection signs and symptoms   Outcome: Met This Shift      Problem: Nutrition Deficit - Risk
Problem: Risk for Impaired Skin Integrity  Goal: Tissue integrity - skin and mucous membranes  Structural intactness and normal physiological function of skin and  mucous membranes.    Outcome: Met This Shift      Problem: Falls - Risk of  Goal: Absence of falls  Outcome: Met This Shift      Problem: HEMODYNAMIC STATUS  Goal: Patient has stable vital signs and fluid balance  Outcome: Met This Shift      Problem: ACTIVITY INTOLERANCE/IMPAIRED MOBILITY  Goal: Mobility/activity is maintained at optimum level for patient  Outcome: Met This Shift      Problem: COMMUNICATION IMPAIRMENT  Goal: Ability to express needs and understand communication  Outcome: Not Met This Shift      Problem: Verbal Communication - Impaired:  Goal: Functional communication will improve  Functional communication will improve   Outcome: Not Met This Shift    Goal: Ability to interact with others will improve  Ability to interact with others will improve   Outcome: Not Met This Shift    Goal: Ability to establish a method of communication will improve  Ability to establish a method of communication will improve   Outcome: Not Met This Shift      Problem: Pain:  Goal: Pain level will decrease  Pain level will decrease   Outcome: Not Met This Shift    Goal: Recognizes and communicates pain  Recognizes and communicates pain   Outcome: Not Met This Shift
Problem: Risk for Impaired Skin Integrity  Goal: Tissue integrity - skin and mucous membranes  Structural intactness and normal physiological function of skin and  mucous membranes.    Outcome: Ongoing  Consistent alternating of pressure with pillow support and cleansing the skin when needed
Problem: Risk for Impaired Skin Integrity  Goal: Tissue integrity - skin and mucous membranes  Structural intactness and normal physiological function of skin and  mucous membranes. Outcome: Met This Shift      Problem: Falls - Risk of  Goal: Absence of falls  Outcome: Met This Shift  Plan of care discussed with patient/family. Patient/family incorporated into plan of care.
Ability to achieve adequate nutritional intake will improve  Ability to achieve adequate nutritional intake will improve   Outcome: Met This Shift    Goal: Maintenance of adequate weight for body size and type will improve to within specified parameters  Maintenance of adequate weight for body size and type will improve to within specified parameters   Outcome: Ongoing      Problem: Aspiration - Risk of:  Goal: Absence of aspiration  Absence of aspiration   Outcome: Met This Shift      Problem: Nutrition  Goal: Optimal nutrition therapy  Outcome: Ongoing
Known